# Patient Record
Sex: MALE | Race: WHITE | HISPANIC OR LATINO | URBAN - METROPOLITAN AREA
[De-identification: names, ages, dates, MRNs, and addresses within clinical notes are randomized per-mention and may not be internally consistent; named-entity substitution may affect disease eponyms.]

---

## 2018-04-27 ENCOUNTER — EMERGENCY (EMERGENCY)
Facility: HOSPITAL | Age: 23
LOS: 1 days | Discharge: HOME | End: 2018-04-27
Admitting: EMERGENCY MEDICINE
Payer: MEDICAID

## 2018-04-27 VITALS
RESPIRATION RATE: 20 BRPM | TEMPERATURE: 97 F | HEART RATE: 73 BPM | SYSTOLIC BLOOD PRESSURE: 142 MMHG | OXYGEN SATURATION: 100 % | DIASTOLIC BLOOD PRESSURE: 98 MMHG

## 2018-04-27 DIAGNOSIS — J34.89 OTHER SPECIFIED DISORDERS OF NOSE AND NASAL SINUSES: ICD-10-CM

## 2018-04-27 DIAGNOSIS — S02.2XXA FRACTURE OF NASAL BONES, INITIAL ENCOUNTER FOR CLOSED FRACTURE: ICD-10-CM
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
## 2018-04-27 PROCEDURE — 99284 EMERGENCY DEPT VISIT MOD MDM: CPT | Mod: 25

## 2018-04-27 RX ORDER — ACETAMINOPHEN 500 MG
650 TABLET ORAL ONCE
Refills: 0 | Status: COMPLETED | OUTPATIENT
Start: 2018-04-27 | End: 2018-04-27

## 2018-04-27 RX ADMIN — Medication 650 MILLIGRAM(S): at 23:37

## 2018-04-27 NOTE — ED ADULT NURSE NOTE - FALL HARM RISK TYPE OF ASSESSMENT
Thank you for choosing the [facility], Erin Randolph MD, and our team as your Neurology Specialists.  We actively use feedback to constantly improve and deliver the best care possible. To provide the best experience, we are collecting feedback from you on how we performed.  You may receive a survey in the mail to evaluate how we did. Please take a moment and share your thoughts.      If for any reason you feel that we did not meet your expectations or you want to share a positive experience, please give us a call. Your feedback helps us know how we are doing and what we can be doing better.    Office hours: 8:00 am to 4:30 pm, Monday - Friday  Phone: [phone]     Daily Assessment
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
## 2018-04-28 PROCEDURE — 70486 CT MAXILLOFACIAL W/O DYE: CPT | Mod: 26

## 2018-04-28 PROCEDURE — 70450 CT HEAD/BRAIN W/O DYE: CPT | Mod: 26

## 2018-04-28 NOTE — ED PROVIDER NOTE - NORMAL STATEMENT, MLM
Head AT, NC  Neck no cervical tenderness or  bony stepoffs.   Airway patent, +moderate nasal swelling with ttp, no lac or abrasion, no nasal bleeding, +old blood in bilateral nares, no septal hematomas bilaterally, mouth with normal mucosa. Throat has no vesicles, no oropharyngeal exudates and uvula is midline. Clear tympanic membranes bilaterally.
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
## 2018-05-01 ENCOUNTER — OUTPATIENT (OUTPATIENT)
Dept: OUTPATIENT SERVICES | Facility: HOSPITAL | Age: 23
LOS: 1 days | End: 2018-05-01
Payer: MEDICAID

## 2018-05-07 DIAGNOSIS — R69 ILLNESS, UNSPECIFIED: ICD-10-CM

## 2018-08-02 ENCOUNTER — EMERGENCY (EMERGENCY)
Facility: HOSPITAL | Age: 23
LOS: 1 days | Discharge: HOME | End: 2018-08-02
Attending: EMERGENCY MEDICINE
Payer: MEDICAID

## 2018-08-02 VITALS
WEIGHT: 147.05 LBS | DIASTOLIC BLOOD PRESSURE: 78 MMHG | TEMPERATURE: 99 F | SYSTOLIC BLOOD PRESSURE: 123 MMHG | HEART RATE: 92 BPM | RESPIRATION RATE: 20 BRPM

## 2018-08-02 VITALS
TEMPERATURE: 98 F | SYSTOLIC BLOOD PRESSURE: 114 MMHG | HEART RATE: 90 BPM | OXYGEN SATURATION: 100 % | RESPIRATION RATE: 20 BRPM | DIASTOLIC BLOOD PRESSURE: 75 MMHG

## 2018-08-02 LAB
ALBUMIN SERPL ELPH-MCNC: 4.1 G/DL — SIGNIFICANT CHANGE UP (ref 3.5–5)
ALP SERPL-CCNC: 58 U/L — SIGNIFICANT CHANGE UP (ref 40–120)
ALT FLD-CCNC: 23 U/L DA — SIGNIFICANT CHANGE UP (ref 10–60)
ANION GAP SERPL CALC-SCNC: 9 MMOL/L — SIGNIFICANT CHANGE UP (ref 5–17)
APPEARANCE UR: CLEAR — SIGNIFICANT CHANGE UP
AST SERPL-CCNC: 24 U/L — SIGNIFICANT CHANGE UP (ref 10–40)
BASOPHILS # BLD AUTO: 0.1 K/UL — SIGNIFICANT CHANGE UP (ref 0–0.2)
BASOPHILS NFR BLD AUTO: 0.4 % — SIGNIFICANT CHANGE UP (ref 0–2)
BILIRUB SERPL-MCNC: 0.3 MG/DL — SIGNIFICANT CHANGE UP (ref 0.2–1.2)
BILIRUB UR-MCNC: NEGATIVE — SIGNIFICANT CHANGE UP
BUN SERPL-MCNC: 16 MG/DL — SIGNIFICANT CHANGE UP (ref 7–18)
CALCIUM SERPL-MCNC: 8.8 MG/DL — SIGNIFICANT CHANGE UP (ref 8.4–10.5)
CHLORIDE SERPL-SCNC: 107 MMOL/L — SIGNIFICANT CHANGE UP (ref 96–108)
CO2 SERPL-SCNC: 26 MMOL/L — SIGNIFICANT CHANGE UP (ref 22–31)
COLOR SPEC: YELLOW — SIGNIFICANT CHANGE UP
CREAT SERPL-MCNC: 0.65 MG/DL — SIGNIFICANT CHANGE UP (ref 0.5–1.3)
DIFF PNL FLD: NEGATIVE — SIGNIFICANT CHANGE UP
EOSINOPHIL # BLD AUTO: 0 K/UL — SIGNIFICANT CHANGE UP (ref 0–0.5)
EOSINOPHIL NFR BLD AUTO: 0 % — SIGNIFICANT CHANGE UP (ref 0–6)
GLUCOSE SERPL-MCNC: 116 MG/DL — HIGH (ref 70–99)
GLUCOSE UR QL: NEGATIVE — SIGNIFICANT CHANGE UP
HCT VFR BLD CALC: 38 % — LOW (ref 39–50)
HGB BLD-MCNC: 12.6 G/DL — LOW (ref 13–17)
KETONES UR-MCNC: ABNORMAL
LEUKOCYTE ESTERASE UR-ACNC: ABNORMAL
LIDOCAIN IGE QN: 38 U/L — LOW (ref 73–393)
LYMPHOCYTES # BLD AUTO: 0.8 K/UL — LOW (ref 1–3.3)
LYMPHOCYTES # BLD AUTO: 5.5 % — LOW (ref 13–44)
MCHC RBC-ENTMCNC: 32.6 PG — SIGNIFICANT CHANGE UP (ref 27–34)
MCHC RBC-ENTMCNC: 33.1 GM/DL — SIGNIFICANT CHANGE UP (ref 32–36)
MCV RBC AUTO: 98.4 FL — SIGNIFICANT CHANGE UP (ref 80–100)
MONOCYTES # BLD AUTO: 0.2 K/UL — SIGNIFICANT CHANGE UP (ref 0–0.9)
MONOCYTES NFR BLD AUTO: 1.2 % — LOW (ref 2–14)
NEUTROPHILS # BLD AUTO: 13.7 K/UL — HIGH (ref 1.8–7.4)
NEUTROPHILS NFR BLD AUTO: 92.8 % — HIGH (ref 43–77)
NITRITE UR-MCNC: NEGATIVE — SIGNIFICANT CHANGE UP
PH UR: 8 — SIGNIFICANT CHANGE UP (ref 5–8)
PLATELET # BLD AUTO: 45 K/UL — LOW (ref 150–400)
POTASSIUM SERPL-MCNC: 3.8 MMOL/L — SIGNIFICANT CHANGE UP (ref 3.5–5.3)
POTASSIUM SERPL-SCNC: 3.8 MMOL/L — SIGNIFICANT CHANGE UP (ref 3.5–5.3)
PROT SERPL-MCNC: 7.5 G/DL — SIGNIFICANT CHANGE UP (ref 6–8.3)
PROT UR-MCNC: NEGATIVE — SIGNIFICANT CHANGE UP
RBC # BLD: 3.86 M/UL — LOW (ref 4.2–5.8)
RBC # FLD: 12.7 % — SIGNIFICANT CHANGE UP (ref 10.3–14.5)
SODIUM SERPL-SCNC: 142 MMOL/L — SIGNIFICANT CHANGE UP (ref 135–145)
SP GR SPEC: 1.01 — SIGNIFICANT CHANGE UP (ref 1.01–1.02)
UROBILINOGEN FLD QL: NEGATIVE — SIGNIFICANT CHANGE UP
WBC # BLD: 14.8 K/UL — HIGH (ref 3.8–10.5)
WBC # FLD AUTO: 14.8 K/UL — HIGH (ref 3.8–10.5)

## 2018-08-02 PROCEDURE — 81001 URINALYSIS AUTO W/SCOPE: CPT

## 2018-08-02 PROCEDURE — 74177 CT ABD & PELVIS W/CONTRAST: CPT

## 2018-08-02 PROCEDURE — 96375 TX/PRO/DX INJ NEW DRUG ADDON: CPT | Mod: 76

## 2018-08-02 PROCEDURE — 96374 THER/PROPH/DIAG INJ IV PUSH: CPT | Mod: XU

## 2018-08-02 PROCEDURE — 99284 EMERGENCY DEPT VISIT MOD MDM: CPT | Mod: 25

## 2018-08-02 PROCEDURE — 83690 ASSAY OF LIPASE: CPT

## 2018-08-02 PROCEDURE — 74177 CT ABD & PELVIS W/CONTRAST: CPT | Mod: 26

## 2018-08-02 PROCEDURE — 85027 COMPLETE CBC AUTOMATED: CPT

## 2018-08-02 PROCEDURE — 80053 COMPREHEN METABOLIC PANEL: CPT

## 2018-08-02 RX ORDER — ONDANSETRON 8 MG/1
4 TABLET, FILM COATED ORAL ONCE
Qty: 0 | Refills: 0 | Status: COMPLETED | OUTPATIENT
Start: 2018-08-02 | End: 2018-08-02

## 2018-08-02 RX ORDER — CEFTRIAXONE 500 MG/1
1 INJECTION, POWDER, FOR SOLUTION INTRAMUSCULAR; INTRAVENOUS ONCE
Qty: 0 | Refills: 0 | Status: COMPLETED | OUTPATIENT
Start: 2018-08-02 | End: 2018-08-02

## 2018-08-02 RX ORDER — SODIUM CHLORIDE 9 MG/ML
1000 INJECTION INTRAMUSCULAR; INTRAVENOUS; SUBCUTANEOUS ONCE
Qty: 0 | Refills: 0 | Status: COMPLETED | OUTPATIENT
Start: 2018-08-02 | End: 2018-08-02

## 2018-08-02 RX ORDER — METRONIDAZOLE 500 MG
1 TABLET ORAL
Qty: 30 | Refills: 0 | OUTPATIENT
Start: 2018-08-02 | End: 2018-08-11

## 2018-08-02 RX ORDER — MOXIFLOXACIN HYDROCHLORIDE TABLETS, 400 MG 400 MG/1
1 TABLET, FILM COATED ORAL
Qty: 20 | Refills: 0 | OUTPATIENT
Start: 2018-08-02 | End: 2018-08-11

## 2018-08-02 RX ORDER — METRONIDAZOLE 500 MG
500 TABLET ORAL ONCE
Qty: 0 | Refills: 0 | Status: COMPLETED | OUTPATIENT
Start: 2018-08-02 | End: 2018-08-02

## 2018-08-02 RX ADMIN — Medication 100 MILLIGRAM(S): at 11:12

## 2018-08-02 RX ADMIN — CEFTRIAXONE 100 GRAM(S): 500 INJECTION, POWDER, FOR SOLUTION INTRAMUSCULAR; INTRAVENOUS at 10:24

## 2018-08-02 RX ADMIN — SODIUM CHLORIDE 1000 MILLILITER(S): 9 INJECTION INTRAMUSCULAR; INTRAVENOUS; SUBCUTANEOUS at 06:22

## 2018-08-02 RX ADMIN — ONDANSETRON 4 MILLIGRAM(S): 8 TABLET, FILM COATED ORAL at 06:37
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
## 2018-08-02 NOTE — ED ADULT NURSE NOTE - NSIMPLEMENTINTERV_GEN_ALL_ED
Implemented All Universal Safety Interventions:  [place] to call system. Call bell, personal items and telephone within reach. Instruct patient to call for assistance. Room bathroom lighting operational. Non-slip footwear when patient is off stretcher. Physically safe environment: no spills, clutter or unnecessary equipment. Stretcher in lowest position, wheels locked, appropriate side rails in place.

## 2018-08-02 NOTE — ED PROVIDER NOTE - MEDICAL DECISION MAKING DETAILS
23 year old F Pt w/ abdominal pain, vomiting, diarrhea, will check CT abdomen/pelvis, labs, UA. Pt declines pain. medication. 23 year old F Pt w/ abdominal pain, vomiting, diarrhea, will check CT abdomen/pelvis, labs, UA. Pt declines pain. medication.    see progress note.

## 2018-08-02 NOTE — ED PROVIDER NOTE - CONDUCTED A DETAILED DISCUSSION WITH PATIENT AND/OR GUARDIAN REGARDING, MDM
lab results radiology results/return to ED if symptoms worsen, persist or questions arise/lab results/need for outpatient follow-up

## 2018-08-02 NOTE — ED PROVIDER NOTE - NS_ ATTENDINGSCRIBEDETAILS _ED_A_ED_FT
The scribe's documentation has been prepared under my direction and personally reviewed by me in its entirety. I confirm that the note above accurately reflects all work, treatment, procedures, and medical decision making performed by me. The scribe's documentation has been prepared under my direction and personally reviewed by me in its entirety. I confirm that the note above accurately reflects all work, treatment, procedures, and medical decision making performed by me..

## 2018-08-02 NOTE — ED ADULT TRIAGE NOTE - ARRIVAL INFO ADDITIONAL COMMENTS
pt biba from home reporting  vomiting x 10 hours, denies fever, diarrhea, recent travel or sick contacts.

## 2018-08-02 NOTE — ED PROVIDER NOTE - PROGRESS NOTE DETAILS
signed out to me by night team pending ctap. pt feels better, ct shows colitis, gave abx. vs wnl. abd nontender on reexamination. wants to go home. will try po abx. advised to return to ED if unable to tolerate po abx, pain persists or any concerning sx.

## 2018-08-02 NOTE — ED PROVIDER NOTE - OBJECTIVE STATEMENT
23 year old transgender F (male to female) Pt w/ no PMHx presents c/o  vomiting, 2 episodes of diarrhea, diffuse abdominal pain x 10 pm last night. Pt denies fever, chills, dysuria, and all other complaints. Pt reports symptoms began shortly after eating sushi.

## 2018-09-14 ENCOUNTER — EMERGENCY (EMERGENCY)
Facility: HOSPITAL | Age: 23
LOS: 1 days | Discharge: HOME | End: 2018-09-14
Attending: EMERGENCY MEDICINE
Payer: MEDICAID

## 2018-09-14 VITALS
OXYGEN SATURATION: 99 % | TEMPERATURE: 98 F | DIASTOLIC BLOOD PRESSURE: 45 MMHG | SYSTOLIC BLOOD PRESSURE: 97 MMHG | RESPIRATION RATE: 20 BRPM | HEART RATE: 66 BPM

## 2018-09-14 VITALS
TEMPERATURE: 98 F | HEART RATE: 75 BPM | WEIGHT: 145.06 LBS | HEIGHT: 69 IN | OXYGEN SATURATION: 98 % | DIASTOLIC BLOOD PRESSURE: 67 MMHG | RESPIRATION RATE: 20 BRPM | SYSTOLIC BLOOD PRESSURE: 98 MMHG

## 2018-09-14 LAB
ALBUMIN SERPL ELPH-MCNC: 3.9 G/DL — SIGNIFICANT CHANGE UP (ref 3.5–5)
ALP SERPL-CCNC: 70 U/L — SIGNIFICANT CHANGE UP (ref 40–120)
ALT FLD-CCNC: 21 U/L DA — SIGNIFICANT CHANGE UP (ref 10–60)
ANION GAP SERPL CALC-SCNC: 6 MMOL/L — SIGNIFICANT CHANGE UP (ref 5–17)
AST SERPL-CCNC: 15 U/L — SIGNIFICANT CHANGE UP (ref 10–40)
BASOPHILS # BLD AUTO: 0.1 K/UL — SIGNIFICANT CHANGE UP (ref 0–0.2)
BASOPHILS NFR BLD AUTO: 0.8 % — SIGNIFICANT CHANGE UP (ref 0–2)
BILIRUB SERPL-MCNC: 0.7 MG/DL — SIGNIFICANT CHANGE UP (ref 0.2–1.2)
BUN SERPL-MCNC: 12 MG/DL — SIGNIFICANT CHANGE UP (ref 7–18)
CALCIUM SERPL-MCNC: 8.6 MG/DL — SIGNIFICANT CHANGE UP (ref 8.4–10.5)
CHLORIDE SERPL-SCNC: 105 MMOL/L — SIGNIFICANT CHANGE UP (ref 96–108)
CO2 SERPL-SCNC: 28 MMOL/L — SIGNIFICANT CHANGE UP (ref 22–31)
CREAT SERPL-MCNC: 0.78 MG/DL — SIGNIFICANT CHANGE UP (ref 0.5–1.3)
EOSINOPHIL # BLD AUTO: 0.5 K/UL — SIGNIFICANT CHANGE UP (ref 0–0.5)
EOSINOPHIL NFR BLD AUTO: 3.7 % — SIGNIFICANT CHANGE UP (ref 0–6)
GLUCOSE SERPL-MCNC: 114 MG/DL — HIGH (ref 70–99)
HCT VFR BLD CALC: 39.1 % — SIGNIFICANT CHANGE UP (ref 39–50)
HGB BLD-MCNC: 12.7 G/DL — LOW (ref 13–17)
LYMPHOCYTES # BLD AUTO: 1.4 K/UL — SIGNIFICANT CHANGE UP (ref 1–3.3)
LYMPHOCYTES # BLD AUTO: 11.1 % — LOW (ref 13–44)
MCHC RBC-ENTMCNC: 31.6 PG — SIGNIFICANT CHANGE UP (ref 27–34)
MCHC RBC-ENTMCNC: 32.5 GM/DL — SIGNIFICANT CHANGE UP (ref 32–36)
MCV RBC AUTO: 97.5 FL — SIGNIFICANT CHANGE UP (ref 80–100)
MONOCYTES # BLD AUTO: 0.4 K/UL — SIGNIFICANT CHANGE UP (ref 0–0.9)
MONOCYTES NFR BLD AUTO: 3.5 % — SIGNIFICANT CHANGE UP (ref 2–14)
NEUTROPHILS # BLD AUTO: 10.3 K/UL — HIGH (ref 1.8–7.4)
NEUTROPHILS NFR BLD AUTO: 80.9 % — HIGH (ref 43–77)
PLATELET # BLD AUTO: 164 K/UL — SIGNIFICANT CHANGE UP (ref 150–400)
POTASSIUM SERPL-MCNC: 4 MMOL/L — SIGNIFICANT CHANGE UP (ref 3.5–5.3)
POTASSIUM SERPL-SCNC: 4 MMOL/L — SIGNIFICANT CHANGE UP (ref 3.5–5.3)
PROT SERPL-MCNC: 7.1 G/DL — SIGNIFICANT CHANGE UP (ref 6–8.3)
RBC # BLD: 4.01 M/UL — LOW (ref 4.2–5.8)
RBC # FLD: 12.4 % — SIGNIFICANT CHANGE UP (ref 10.3–14.5)
SODIUM SERPL-SCNC: 139 MMOL/L — SIGNIFICANT CHANGE UP (ref 135–145)
WBC # BLD: 12.7 K/UL — HIGH (ref 3.8–10.5)
WBC # FLD AUTO: 12.7 K/UL — HIGH (ref 3.8–10.5)

## 2018-09-14 PROCEDURE — 99283 EMERGENCY DEPT VISIT LOW MDM: CPT

## 2018-09-14 PROCEDURE — 85027 COMPLETE CBC AUTOMATED: CPT

## 2018-09-14 PROCEDURE — 96374 THER/PROPH/DIAG INJ IV PUSH: CPT

## 2018-09-14 PROCEDURE — 99284 EMERGENCY DEPT VISIT MOD MDM: CPT | Mod: 25

## 2018-09-14 PROCEDURE — 80053 COMPREHEN METABOLIC PANEL: CPT

## 2018-09-14 RX ORDER — DIPHENHYDRAMINE HCL 50 MG
50 CAPSULE ORAL ONCE
Qty: 0 | Refills: 0 | Status: COMPLETED | OUTPATIENT
Start: 2018-09-14 | End: 2018-09-14

## 2018-09-14 RX ADMIN — Medication 50 MILLIGRAM(S): at 10:30

## 2018-09-14 NOTE — ED PROVIDER NOTE - MEDICAL DECISION MAKING DETAILS
Patient with rash, doesn't appear to be angioedema or anaphylaxis, patient request blood work, likely discharge patient with Benadryl/ H1 blocker.

## 2018-09-14 NOTE — ED ADULT NURSE NOTE - NSIMPLEMENTINTERV_GEN_ALL_ED
Implemented All Universal Safety Interventions:  [place] to call system. Call bell, personal items and telephone within reach. Instruct patient to call for assistance. Room bathroom lighting operational. Non-slip footwear when patient is off stretcher. Physically safe environment: no spills, clutter or unnecessary equipment. Stretcher in lowest position, wheels locked, appropriate side rails in place.

## 2018-09-14 NOTE — ED PROVIDER NOTE - OBJECTIVE STATEMENT
22 y/o M patient currently transitioning from M-F w/ no significant PMHx and no significant PSHx presents to the ED with a facial rash and itchiness on the upper chest. Patient admits to also experiencing mild nausea. Patient denies abdominal pain and any other complaints. Patient is currently prescribed estrogen hormones, Spironolactone which patient has been using many years for transitioning. NKDA.
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
## 2018-09-14 NOTE — ED PROVIDER NOTE - PROGRESS NOTE DETAILS
reviewed results w pt, feeling better, advised return precautions. reviewed results w pt, feeling better, advised return precautions. allergy appt advised.

## 2022-02-08 NOTE — ED PROVIDER NOTE - SKIN, MLM
Patient presents to ED with c/o left lower abd pain that started today around 1pm
Skin normal color for race, warm, dry and intact. No evidence of rash.

## 2022-06-02 ENCOUNTER — APPOINTMENT (OUTPATIENT)
Dept: PEDIATRIC ALLERGY IMMUNOLOGY | Facility: CLINIC | Age: 27
End: 2022-06-02

## 2022-06-08 ENCOUNTER — APPOINTMENT (OUTPATIENT)
Dept: PEDIATRIC ALLERGY IMMUNOLOGY | Facility: CLINIC | Age: 27
End: 2022-06-08

## 2022-06-08 ENCOUNTER — APPOINTMENT (OUTPATIENT)
Dept: GASTROENTEROLOGY | Facility: CLINIC | Age: 27
End: 2022-06-08
Payer: MEDICAID

## 2022-06-08 ENCOUNTER — LABORATORY RESULT (OUTPATIENT)
Age: 27
End: 2022-06-08

## 2022-06-08 ENCOUNTER — OUTPATIENT (OUTPATIENT)
Dept: OUTPATIENT SERVICES | Facility: HOSPITAL | Age: 27
LOS: 1 days | End: 2022-06-08
Payer: MEDICAID

## 2022-06-08 VITALS
DIASTOLIC BLOOD PRESSURE: 80 MMHG | HEIGHT: 67 IN | OXYGEN SATURATION: 98 % | WEIGHT: 148 LBS | BODY MASS INDEX: 23.23 KG/M2 | SYSTOLIC BLOOD PRESSURE: 120 MMHG | HEART RATE: 70 BPM

## 2022-06-08 DIAGNOSIS — R10.32 RIGHT LOWER QUADRANT PAIN: ICD-10-CM

## 2022-06-08 DIAGNOSIS — Z80.41 FAMILY HISTORY OF MALIGNANT NEOPLASM OF OVARY: ICD-10-CM

## 2022-06-08 DIAGNOSIS — Z80.8 FAMILY HISTORY OF MALIGNANT NEOPLASM OF OTHER ORGANS OR SYSTEMS: ICD-10-CM

## 2022-06-08 DIAGNOSIS — R10.13 EPIGASTRIC PAIN: ICD-10-CM

## 2022-06-08 DIAGNOSIS — Z80.0 FAMILY HISTORY OF MALIGNANT NEOPLASM OF DIGESTIVE ORGANS: ICD-10-CM

## 2022-06-08 DIAGNOSIS — Z00.00 ENCOUNTER FOR GENERAL ADULT MEDICAL EXAMINATION WITHOUT ABNORMAL FINDINGS: ICD-10-CM

## 2022-06-08 DIAGNOSIS — R10.31 RIGHT LOWER QUADRANT PAIN: ICD-10-CM

## 2022-06-08 DIAGNOSIS — E55.9 VITAMIN D DEFICIENCY, UNSPECIFIED: ICD-10-CM

## 2022-06-08 DIAGNOSIS — K62.5 HEMORRHAGE OF ANUS AND RECTUM: ICD-10-CM

## 2022-06-08 DIAGNOSIS — Z78.9 OTHER SPECIFIED HEALTH STATUS: ICD-10-CM

## 2022-06-08 PROCEDURE — 99215 OFFICE O/P EST HI 40 MIN: CPT | Mod: 1L

## 2022-06-08 PROCEDURE — G0463: CPT

## 2022-06-08 PROCEDURE — 99204 OFFICE O/P NEW MOD 45 MIN: CPT

## 2022-06-08 RX ORDER — ESTRADIOL VALERATE 40 MG/ML
40 INJECTION INTRAMUSCULAR
Qty: 1 | Refills: 0 | Status: ACTIVE | COMMUNITY
Start: 2022-06-08 | End: 1900-01-01

## 2022-06-08 NOTE — SOCIAL HISTORY
[Sexually Active] : The patient is sexually active [Sometimes] : sometimes [Vaginal] : vaginal [Oral-Receptive (pt. mouth)] : oral-receptive (pt. mouth) [Anal-Receptive (pt anus)] : anal-receptive (pt anus) [To Pt Genitalia] : pt genitalia [Male ___] : [unfilled] male

## 2022-06-09 ENCOUNTER — RX CHANGE (OUTPATIENT)
Age: 27
End: 2022-06-09

## 2022-06-09 ENCOUNTER — TRANSCRIPTION ENCOUNTER (OUTPATIENT)
Age: 27
End: 2022-06-09

## 2022-06-09 DIAGNOSIS — F64.0 TRANSSEXUALISM: ICD-10-CM

## 2022-06-09 PROBLEM — R10.31 ABDOMINAL PAIN, BILATERAL LOWER QUADRANT: Status: ACTIVE | Noted: 2022-06-09

## 2022-06-09 PROBLEM — R10.13 DYSPEPSIA: Status: ACTIVE | Noted: 2022-06-09

## 2022-06-09 PROBLEM — K62.5 RECTAL BLEEDING: Status: ACTIVE | Noted: 2022-06-09

## 2022-06-09 PROBLEM — Z78.9 MALE-TO-FEMALE TRANSGENDER PERSON: Status: ACTIVE | Noted: 2022-06-02

## 2022-06-09 RX ORDER — FAMOTIDINE 20 MG/1
20 TABLET ORAL
Qty: 120 | Refills: 3 | Status: ACTIVE | COMMUNITY
Start: 2022-06-09 | End: 1900-01-01

## 2022-06-09 RX ORDER — WHEAT DEXTRIN/CALCIUM/ASPARTAM 3 G-200/6G
POWDER (GRAM) ORAL DAILY
Qty: 2 | Refills: 3 | Status: ACTIVE | COMMUNITY
Start: 2022-06-09 | End: 1900-01-01

## 2022-06-09 RX ORDER — BIFIDOBACTERIUM LONGUM 10MM CELL
4 CAPSULE ORAL
Qty: 30 | Refills: 3 | Status: ACTIVE | COMMUNITY
Start: 2022-06-09 | End: 1900-01-01

## 2022-06-09 NOTE — REVIEW OF SYSTEMS
[Feeling Tired] : feeling tired [Eyesight Problems] : eyesight problems [Abdominal Pain] : abdominal pain [Vomiting] : vomiting [Constipation] : constipation [Heartburn] : heartburn [Nocturia] : nocturia [Anxiety] : anxiety [Negative] : Heme/Lymph [FreeTextEntry8] : polyuria

## 2022-06-09 NOTE — ASSESSMENT
[FreeTextEntry1] : Abdominal Pain: The patient complains of abdominal pain. The patient is to avoid nonsteroidal anti-inflammatory drugs and aspirin.  I recommend a trial of Gas X 1 tablet PO 4 times a day for 3 months for the symptoms.\par Dyspepsia: The patient complains of dyspeptic symptoms.  The patient was advised to abide by an anti-gas (low FOD-MAP) diet.  The patient was given a pamphlet for anti-gas (low FOD-MAP).  The patient and I reviewed the anti-gas (low FOD-MAP) diet at length. The patient is to start on a trial of Simethicone one tablet 4 times a day p.r.n. abdominal pain and gas.\par GERD: The patient was advised to avoid late-night meals and dietary indiscretions.  The patient was advised to avoid fried and fatty foods.  The patient was advised to abide by an anti-GERD diet. The patient was given a pamphlet for anti-GERD.  The patient and I reviewed the anti-GERD diet at length. I recommend a trial of famotidine 20 mg twice a day x 3 months for the symptoms.\par Nausea/Vomiting: The patient complains of nausea/vomiting. If the symptoms of nausea/vomiting persists, the patient may require a trial of Zofran 4 mg twice a day.\par Constipation: The patient complains of constipation. I recommend a high-fiber diet. I recommend a trial of a probiotic such as Align once a day. I recommend a trial of Benefiber once a day for fiber supplementation.   The patient agreed and will followup to reassess the symptoms.  \par Rectal Bleeding: The patient had episodes of rectal bleeding.  The etiology of the rectal bleeding is unclear.  The patient may require a trial of Anusol HC suppositories one per rectum QHS and Anusol HC 2.5% cream apply to affected area twice a day PRN hemorrhoidal bleeding or pain.   I recommend Tucks pads for the hemorrhoids.  I recommend avoid wearing tight underwear and use boxers. I recommend avoid touching the perineal area. I recommend a colonoscopy to assess the site of bleeding. The patient was told of the risks and benefits of the procedure.  The patient was told of the risks of perforation, emergency surgery, bleeding, infections and missed lesions.  The patient agreed but will wait to schedule for the procedure at this time. The patient is to undergo vaginoscopy to assess the vaginal bleeding and discuss the possible need for the colonoscopy and possible MRI of the anus, pelvis and rectum with and without IV contrast to assess for possible recto-vaginal fistula versus other causes of the rectal and vaginal bleeding with the surgeon.  The patient is to return for the procedure.\par Blood Work: I recommend obtaining the recent blood work performed on the patient.\par Imaging Study: I recommend an imaging study to assess the symptoms. I recommend an MRI of the anus, pelvis and rectum with and without IV contrast to assess for possible recto-vaginal fistula versus other causes of the rectal and vaginal bleeding.  The patient is to return for the test after the vaginoscopy to assess the vaginal bleeding.\par Family History of GI Malignancy: The patient admits to a family history of GI problems.  The patient’s father and paternal grandfather had a history of gastric cancer.  I recommend an H. pylori breath test to assess for infection that carries an increased risk of gastric cancer.  Depending on the results and patient's symptoms, the patient may require an upper endoscopy to assess the stomach.\par \par \par \par

## 2022-06-09 NOTE — HISTORY OF PRESENT ILLNESS
[None] : had no significant interval events [Diarrhea] : denies diarrhea [Yellow Skin Or Eyes (Jaundice)] : denies jaundice [Rectal Pain] : denies rectal pain [Wt Gain ___ Lbs] : recent [unfilled] ~Upound(s) weight gain [Heartburn] : heartburn [Nausea] : nausea [Vomiting] : vomiting [Constipation] : constipation [Abdominal Pain] : abdominal pain [Abdominal Swelling] : abdominal swelling [GERD] : gastroesophageal reflux disease [Wt Loss ___ Lbs] : no recent weight loss [Hiatus Hernia] : no hiatus hernia [Peptic Ulcer Disease] : no peptic ulcer disease [Pancreatitis] : no pancreatitis [Cholelithiasis] : no cholelithiasis [Kidney Stone] : no kidney stone [Inflammatory Bowel Disease] : no inflammatory bowel disease [Irritable Bowel Syndrome] : no irritable bowel syndrome [Diverticulitis] : no diverticulitis [Alcohol Abuse] : no alcohol abuse [Malignancy] : no malignancy [Abdominal Surgery] : no abdominal surgery [Appendectomy] : no appendectomy [Cholecystectomy] : no cholecystectomy [de-identified] : The patient is a 26-year-old  transgender (male to female) with no significant past medical history who was referred to my office by Dr. Shaun Cadena for rectal and vaginal bleeding.  The patient also admits to having abdominal pain, dyspepsia, gastroesophageal reflux disease, nausea/vomiting, constipation, change in bowel habits, change in caliber of stool. I was asked to render an opinion for consultation for the above complaints.   The patient states that she/he is feeling uncomfortable.  The patient attributes the symptoms, starting after s/p vaginoplasty with penile inversion at [facility] on November 15, 2021.   The patient requires dilatations 2 to 3 x a day.  The patient is also s/p s/p breast augmentation and receiving estradiol injections every 2 weeks. The patient is now being followed by Dr. Clifford Lucas from [facility]. The patient was previously followed at [facility]. The patient complains of abdominal pain.  The patient describes the abdominal pain as a crampy, constant lower abdominal discomfort that is nonradiating in nature.  The abdominal pain is worse with meals and improves with passing gas or having a bowel movement.  The abdominal pain is described as moderate in nature.  The abdominal pain occurs in the morning.  The abdominal pain can occur at any time.   The abdominal pain has awakened the patient from sleep.  The abdominal pain is not relieved with any medications.  The abdominal pain is associated with abdominal gas and bloating.  The patient complains of nausea and bilious vomiting.  The patient complains of gastroesophageal reflux disease but denies any dysphagia. The gastroesophageal reflux disease is worse after meals and late at night and in the early morning.  The patient denies taking medications for the gastroesophageal reflux disease. The patient complains of palpitations but denies any atypical chest pain or shortness of breath. The patient admits to occasional episodes of diaphoresis.  The patient complains of constipation but denies any diarrhea.  The patient has 1 bowel movement every other day. The patient complains of a change in bowel habits.  The patient complains of a change in caliber of stool.   The patient admits to having mucus discharge with the bowel movements.  The patient complains of rectal bleeding but denies any melena or hematemesis.  The rectal bleeding is associated with constipation and possible internal hemorrhoids.  The patient complains of occasional rectal pruritus but denies any rectal pain. The patient also complains of bleeding from the vaginal area that is worse with constipation.  The patient admits to requiring vaginal dilatation 3 x a day. The patient denies any weight loss or anorexia.  The patient admits to gaining weight recently. He/She denies any fevers or chills.  The patient denies any jaundice or pruritus.  The patient denies any back pain.  The patient denies ever having a prior upper endoscopy and colonoscopy performed by another gastroenterologist.  The patient admits to a family history of GI problems.  The patient’s father and paternal grandfather had a history of gastric cancer. [de-identified] : (-) smoking, (-) ETOH, (-) IVDA\par

## 2022-06-10 LAB
ALBUMIN SERPL ELPH-MCNC: 5 G/DL
ALP BLD-CCNC: 73 U/L
ALT SERPL-CCNC: 7 U/L
ANION GAP SERPL CALC-SCNC: 16 MMOL/L
AST SERPL-CCNC: 14 U/L
BASOPHILS # BLD AUTO: 0.05 K/UL
BASOPHILS NFR BLD AUTO: 0.6 %
BILIRUB SERPL-MCNC: 0.5 MG/DL
BUN SERPL-MCNC: 13 MG/DL
C TRACH RRNA SPEC QL NAA+PROBE: NOT DETECTED
C TRACH RRNA SPEC QL NAA+PROBE: NOT DETECTED
CALCIUM SERPL-MCNC: 9.5 MG/DL
CHLORIDE SERPL-SCNC: 100 MMOL/L
CHOLEST SERPL-MCNC: 162 MG/DL
CO2 SERPL-SCNC: 23 MMOL/L
CREAT SERPL-MCNC: 0.63 MG/DL
EGFR: 135 ML/MIN/1.73M2
EOSINOPHIL # BLD AUTO: 0.36 K/UL
EOSINOPHIL NFR BLD AUTO: 4.2 %
ESTIMATED AVERAGE GLUCOSE: 108 MG/DL
FSH SERPL-MCNC: 5.2 IU/L
GLUCOSE SERPL-MCNC: 101 MG/DL
HBA1C MFR BLD HPLC: 5.4 %
HBV CORE IGG+IGM SER QL: NONREACTIVE
HBV SURFACE AB SERPL IA-ACNC: >1000 MIU/ML
HBV SURFACE AG SER QL: NONREACTIVE
HCT VFR BLD CALC: 37.3 %
HCV AB SER QL: NONREACTIVE
HCV S/CO RATIO: 0.1 S/CO
HDLC SERPL-MCNC: 88 MG/DL
HEPATITIS A IGG ANTIBODY: REACTIVE
HGB BLD-MCNC: 12 G/DL
HIV1+2 AB SPEC QL IA.RAPID: NONREACTIVE
IMM GRANULOCYTES NFR BLD AUTO: 0.2 %
LDLC SERPL CALC-MCNC: 51 MG/DL
LH SERPL-ACNC: 6.3 IU/L
LYMPHOCYTES # BLD AUTO: 1.76 K/UL
LYMPHOCYTES NFR BLD AUTO: 20.5 %
MAN DIFF?: NORMAL
MCHC RBC-ENTMCNC: 32.2 GM/DL
MCHC RBC-ENTMCNC: 32.7 PG
MCV RBC AUTO: 101.6 FL
MONOCYTES # BLD AUTO: 0.47 K/UL
MONOCYTES NFR BLD AUTO: 5.5 %
N GONORRHOEA RRNA SPEC QL NAA+PROBE: NOT DETECTED
N GONORRHOEA RRNA SPEC QL NAA+PROBE: NOT DETECTED
NEUTROPHILS # BLD AUTO: 5.92 K/UL
NEUTROPHILS NFR BLD AUTO: 69 %
NONHDLC SERPL-MCNC: 74 MG/DL
PLATELET # BLD AUTO: 246 K/UL
POTASSIUM SERPL-SCNC: 4.6 MMOL/L
PROLACTIN SERPL-MCNC: 12.9 NG/ML
PROT SERPL-MCNC: 7.1 G/DL
RBC # BLD: 3.67 M/UL
RBC # FLD: 14.3 %
SODIUM SERPL-SCNC: 139 MMOL/L
SOURCE ANAL: NORMAL
SOURCE ORAL: NORMAL
TRIGL SERPL-MCNC: 116 MG/DL
TSH SERPL-ACNC: 1.68 UIU/ML
WBC # FLD AUTO: 8.58 K/UL

## 2022-06-14 LAB
ESTRADIOL SERPL-MCNC: 312 PG/ML
T PALLIDUM AB SER QL IA: POSITIVE

## 2022-06-15 ENCOUNTER — NON-APPOINTMENT (OUTPATIENT)
Age: 27
End: 2022-06-15

## 2022-06-15 ENCOUNTER — APPOINTMENT (OUTPATIENT)
Dept: PEDIATRIC ALLERGY IMMUNOLOGY | Facility: CLINIC | Age: 27
End: 2022-06-15

## 2022-06-20 LAB
MONOMERIC PROLACTIN (ICMA)*: 10.5 NG/ML
PERCENT MACROPROLACTIN: 22 %
PROLACTIN, SERUM (ICMA)*: 13.4 NG/ML

## 2022-06-30 NOTE — ED ADULT NURSE NOTE - FALLEN IN THE PAST
6/30/2022         RE: Rufina Urrutia  [address]  [place] [address]        Dear Colleague,    Thank you for referring your patient, Rufina Urrutia, to the [facility] [place]. Please see a copy of my visit note below.        [facility]  Orthopaedic Surgery, Joint Replacement Consultation  by Zac Salazar M.D.    Rufina Urrutia MRN# [id number]    YOB: 1959     Requesting physician: No ref. provider found  Lawrence Ortega  Nephrology  Jonny Erwin, [facility]  Benjie Garcia            Assessment and Plan:   Assessment:  1. 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 R femoral head necrotic arc coronal 56 deg, sag 115 deg  2. L femoral head necrotic arc coronal 56 deg, sag 106 deg  2. Chronic renal disease on hemodialysis    Plan:  1. I explained to patient that there is no evidence of subchondral collapse or arthritis and I see no indication for performing hip replacement surgery.  However, she has greater than 30% involvement of the femoral head bilaterally and no evidence of collapse or subchondral fracture.  In this situation she is at high risk for eventual subchondral fracture in an effort to prevent this I have discussed with her the possibly performing core decompression augmented by bone marrow aspirate derived concentrated cellular grafting.  We discussed the risk benefits and alternatives to the procedure.  She understands the indication for the procedure is to prevent subchondral collapse and not necessarily to leave pain.  Furthermore the procedure is only helpful in approximately 50% of cases.  Patient has indicated a desire to proceed with above-mentioned surgery.  We will plan on doing so on a bilateral femoral head basis.  2. I will asked our nurse Brooks,  to arrange for the following proposed procedure: Bilateral femoral head core decompression with bone marrow  aspirate concentrate grafting, 1 hour, same-day surgery, tier 3.             History of Present Illness:   62 year old female  chief complaint    This patient returns for review of the MRI examination which was not available at her prior visit.  Furthermore we obtained CT scan to look for evidence of a subchondral fracture that might explain her pain.  I explained to the patient that the imaging studies do not show evidence of subchondral fracture and I am uncertain of the etiology of the pain that she is describing.  I remain concerned that the source of her pain is not from her hips.    Background history:  This patient is seen for third opinion in regards to her osteonecrosis of bilateral hips.  Patient states she has been seen at [facility] by Dr. Jonny Erwin and also Dr. Willie Ocasio at [facility] for evaluation of her hips.  She is having bilateral hip pain, greater on the right side.  She notes the pain mostly in the posterior lateral area of her buttock region.    She states that she was diagnosed with osteonecrosis.  Etiology is uncertain.  Patient states that she has a history of ethanol abuse over 30 years ago but has been sober for the last 30 years.  She acknowledges undergoing chemical dependency program for crack and narcotic usage but none for ethanol usage.    She states that she also has chronic renal disease and has been prescribed 1 week courses of prednisone on 2-3 occasions over   the last year.  No other steroid exposure systemically.  Patient has had numerous steroid injections around her hip.    Dr. Erwin did not prescribe any surgical intervention and Dr. Ocasio referred her for evaluation with me.    Of note is the patient's history of chronic renal disease.      Current symptoms:  Problem: Osteonecrosis of Bilateral Hips   Onset and duration: year and a half ago   Awakens from sleep due to sx's:  Yes  Precipitating Injury:  No    Other joints or sites painful:  Yes      Zac MONTOYA  MD Xiomara Salazar Family Professor  Oncology and Adult Reconstructive Surgery  Dept Orthopaedic Surgery, [facility]  [phone] office, [phone] pager  [email or web address]    Total combined visit time and work time before and after clinic visit = 20 min       no

## 2022-07-01 ENCOUNTER — APPOINTMENT (OUTPATIENT)
Dept: GASTROENTEROLOGY | Facility: CLINIC | Age: 27
End: 2022-07-01

## 2022-07-01 VITALS
HEIGHT: 67 IN | WEIGHT: 150 LBS | DIASTOLIC BLOOD PRESSURE: 76 MMHG | HEART RATE: 80 BPM | TEMPERATURE: 97.1 F | OXYGEN SATURATION: 96 % | BODY MASS INDEX: 23.54 KG/M2 | SYSTOLIC BLOOD PRESSURE: 109 MMHG

## 2022-07-01 DIAGNOSIS — R10.13 EPIGASTRIC PAIN: ICD-10-CM

## 2022-07-01 DIAGNOSIS — K59.00 CONSTIPATION, UNSPECIFIED: ICD-10-CM

## 2022-07-01 DIAGNOSIS — A04.8 OTHER SPECIFIED BACTERIAL INTESTINAL INFECTIONS: ICD-10-CM

## 2022-07-01 DIAGNOSIS — R11.2 NAUSEA WITH VOMITING, UNSPECIFIED: ICD-10-CM

## 2022-07-01 DIAGNOSIS — K21.9 GASTRO-ESOPHAGEAL REFLUX DISEASE W/OUT ESOPHAGITIS: ICD-10-CM

## 2022-07-01 DIAGNOSIS — Z87.19 PERSONAL HISTORY OF OTHER DISEASES OF THE DIGESTIVE SYSTEM: ICD-10-CM

## 2022-07-01 DIAGNOSIS — Z80.9 FAMILY HISTORY OF MALIGNANT NEOPLASM, UNSPECIFIED: ICD-10-CM

## 2022-07-01 LAB — UREA BREATH TEST QL: POSITIVE

## 2022-07-01 PROCEDURE — 99214 OFFICE O/P EST MOD 30 MIN: CPT

## 2022-07-01 RX ORDER — AMOXICILLIN 500 MG/1
500 TABLET, FILM COATED ORAL TWICE DAILY
Qty: 56 | Refills: 0 | Status: ACTIVE | COMMUNITY
Start: 2022-07-01 | End: 1900-01-01

## 2022-07-01 RX ORDER — CLARITHROMYCIN 500 MG/1
500 TABLET, FILM COATED ORAL TWICE DAILY
Qty: 28 | Refills: 0 | Status: ACTIVE | COMMUNITY
Start: 2022-07-01 | End: 1900-01-01

## 2022-07-01 RX ORDER — OMEPRAZOLE 40 MG/1
40 CAPSULE, DELAYED RELEASE ORAL TWICE DAILY
Qty: 28 | Refills: 0 | Status: ACTIVE | COMMUNITY
Start: 2022-07-01 | End: 1900-01-01

## 2022-07-01 NOTE — HISTORY OF PRESENT ILLNESS
[None] : had no significant interval events [Diarrhea] : denies diarrhea [Yellow Skin Or Eyes (Jaundice)] : denies jaundice [Rectal Pain] : denies rectal pain [Heartburn] : heartburn [Nausea] : nausea [Vomiting] : vomiting [Constipation] : constipation [Abdominal Pain] : abdominal pain [Abdominal Swelling] : abdominal swelling [GERD] : gastroesophageal reflux disease [Wt Gain ___ Lbs] : no recent weight gain [Hiatus Hernia] : no hiatus hernia [Peptic Ulcer Disease] : no peptic ulcer disease [Pancreatitis] : no pancreatitis [Cholelithiasis] : no cholelithiasis [Kidney Stone] : no kidney stone [Inflammatory Bowel Disease] : no inflammatory bowel disease [Irritable Bowel Syndrome] : no irritable bowel syndrome [Diverticulitis] : no diverticulitis [Alcohol Abuse] : no alcohol abuse [Malignancy] : no malignancy [Abdominal Surgery] : no abdominal surgery [Appendectomy] : no appendectomy [Cholecystectomy] : no cholecystectomy [de-identified] : The patient is a 26-year-old  transgender (male to female) with no significant past medical history. The patient states that she is feeling uncomfortable. The patient denies any jaundice or pruritus.  The patient denies any complains of chronic lower back pain. The patient complains of abdominal pain.  The patient describes the abdominal pain as a burning, intermittent midepigastric discomfort that is nonradiating in nature.  The abdominal pain is worse with stress.  The abdominal pain is worse with meals and improves with passing gas or having a bowel movement.  The abdominal pain is described as mild to moderate in nature.  The abdominal pain occurs in the morning.  The abdominal pain can occur at any time.   The abdominal pain has never awakened the patient from sleep.  The abdominal pain is slightly relieved with certain medication such as proton pump inhibitors, H2 blockers and antacids.  The abdominal pain is associated with abdominal gas and bloating. The patient complains of nausea and vomiting.  The patient complains of gastroesophageal reflux disease but denies any dysphagia.  The gastroesophageal reflux disease is worse after meals and in the early morning. The gastroesophageal reflux disease is improved with proton pump inhibitors, H2 blockers and antacids.   The patient denies any atypical chest pain, shortness of breath or palpitations.  The patient admits to occasional episodes of diaphoresis.  The patient complains of occasional constipation but denies any diarrhea.  The patient has 1 bowel movement a day. The patient complains of a change in bowel habits.  The patient complains of a change in caliber of stool. The patient denies having mucus discharge with the bowel movements.  The patient denies any bright red blood per rectum, melena or hematemesis.  The patient denies any rectal pain or rectal pruritus.  The patient complains of anorexia but denies any weight loss.  She denies any fevers or chills.  The H. pylori breath test performed on June 29, 2022 was positive. The blood work performed on June 8, 2022 revealed mild anemia with a hemoglobin/hematocrit level of 12.0/37.3, respectively, a normal hemoglobin A1c of 5.4% with an estimated average glucose of 108 mg/dL, a normal TSH of 1.68u IU/mL, a low vitamin D level of 18.8 ng/mL, a reactive hepatitis A IgG antibody, a normal total cholesterol/triglyceride level of 162/116 mg/dL, respectively, and elevated blood glucose of 101 mg/dL, normal liver enzymes with a total bilirubin of 0.5 mg/dL, a normal alkaline phosphatase/AST/ALT of 73/14/7 U/L, respectively, a low testosterone level of <2.5 ng/dL.  The patient is s/p vaginoplasty with penile inversion at [facility] on November 15, 2021. The patient requires dilatations 2 to 3 x a day. The patient is also s/p s/p breast augmentation and receiving estradiol injections every 2 weeks. The patient is now being followed by Dr. Clifford Lucas from [facility]. The patient was previously followed at [facility]. The patient admits to a family history of GI problems. The patient’s father and paternal grandfather had a history of gastric cancer.  [de-identified] : (-) smoking, (-) ETOH, (-) IVDA\par

## 2022-07-01 NOTE — ASSESSMENT
[FreeTextEntry1] : Abdominal Pain: The patient complains of abdominal pain. The patient is to avoid nonsteroidal anti-inflammatory drugs and aspirin. I recommend a trial of Omeprazole 40 mg once a day for 3 months for the symptoms.  \par Dyspepsia: The patient complains of dyspeptic symptoms.  The patient was advised to continue to abide by an anti-gas (low FOD-MAP) diet.  The patient was previously given a pamphlet for anti-gas (low FOD-MAP).  The patient and I reviewed the anti-gas (low FOD-MAP)diet at length again. The patient is to continue on a trial of Simethicone one tablet 4 times a day p.r.n. abdominal pain and gas.\par GERD: The patient was advised to avoid late-night meals and dietary indiscretions.  The patient was advised to avoid fried and fatty foods.  The patient was advised to abide by an anti-GERD diet. The patient was given a pamphlet for anti-GERD.  The patient and I reviewed the anti-GERD diet at length. I recommend a trial of Omeprazole 40 mg once a day x 3 months for the symptoms.\par Nausea: The patient complains of nausea. If the symptoms of nausea persists, the patient may require a trial of Zofran 4 mg twice a day. \par Constipation: The patient complains of constipation. I recommend a high-fiber diet. I recommend a trial of a probiotic such as Align once a day. I recommend a trial of Metamucil once a day for fiber supplementation.  If the symptoms persist, the patient may require a colonoscopy to assess the symptoms.  The patient was told of the risks and benefits of the procedure.  The patient was told of the risks of perforation, emergency surgery, bleeding, infections and missed lesions.  The patient agreed and will followup to reassess the symptoms.  \par H. Pylori Infection: The H. pylori breath test performed on June 29, 2022 was positive.  I recommend a trial of antibiotics for H. pylori eradication.  I recommend a trial of Clarithromycin 500 mg 2 times a day, Amoxicillin 500 mg 2 tablets twice a day and Omeprazole 40 mg twice a day for 14 days for H. pylori eradication. The patient is to then start a trial of Pantoprazole 40 mg once a day x 1 month after completing antibiotic treatment. The patient is to stop Pantoprazole x 2 weeks prior to returning to the office. The patient is to return to the office without eating or drinking anything x 2 hours prior to the scheduled appointment. The patient is to return to the office in 2 months for an H. pylori breath test to assess for eradication of the bacteria.\par History of Rectal Bleeding: The patient had episodes of rectal bleeding that has resolved. The etiology of the rectal bleeding was unclear. The patient may require a trial of Anusol HC suppositories one per rectum QHS and Anusol HC 2.5% cream apply to affected area twice a day PRN hemorrhoidal bleeding or pain. I recommend Tucks pads for the hemorrhoids. I recommend avoid wearing tight underwear and use boxers. I recommend avoid touching the perineal area. If the symptoms recur, the patient may require a colonoscopy to assess the site of bleeding. The patient was told of the risks and benefits of the procedure. The patient was told of the risks of perforation, emergency surgery, bleeding, infections and missed lesions. The patient agreed but will wait to schedule for the procedure at this time. The patient is s/p vaginoscopy to assess the vaginal bleeding and told of granulation tissue.  If the symptoms persist, the patient may require a colonoscopy and possible MRI of the anus, pelvis and rectum with and without IV contrast to assess for possible recto-vaginal fistula versus other causes of the rectal and vaginal bleeding with the surgeon. The patient is to return for re-evaluation.\par Blood Work: I reviewed the recent blood work performed on the patient.\par Imaging Study: If the symptoms persist, the patient may require an MRI of the anus, pelvis and rectum with and without IV contrast to assess for possible recto-vaginal fistula versus other causes of the rectal and vaginal bleeding. The patient is to return for the test s/p vaginoscopy for the vaginal bleeding.\par Family History of GI Malignancy: The patient admits to a family history of GI problems. The patient’s father and paternal grandfather had a history of gastric cancer. The H. pylori breath test was positive.   The H. pylori infection carries an increased risk of gastric cancer. Depending on the symptoms after treatment, the patient may require an upper endoscopy to assess the stomach.\par Follow-up: The patient is to follow-up in the office in 2 months to reassess the symptoms. The patient was told to call the office if any further problems. \par \par \par \par \par \par

## 2022-07-06 ENCOUNTER — APPOINTMENT (OUTPATIENT)
Dept: PEDIATRIC ALLERGY IMMUNOLOGY | Facility: CLINIC | Age: 27
End: 2022-07-06

## 2022-09-29 LAB — 25(OH)D3 SERPL-MCNC: 18.8 NG/ML

## 2022-09-29 RX ORDER — ADHESIVE TAPE 3"X 2.3 YD
50 MCG TAPE, NON-MEDICATED TOPICAL
Qty: 30 | Refills: 3 | Status: ACTIVE | COMMUNITY
Start: 2022-09-29 | End: 1900-01-01

## 2022-12-05 ENCOUNTER — APPOINTMENT (OUTPATIENT)
Dept: PLASTIC SURGERY | Facility: CLINIC | Age: 27
End: 2022-12-05

## 2022-12-05 VITALS — BODY MASS INDEX: 21.66 KG/M2 | WEIGHT: 138 LBS | HEIGHT: 67 IN

## 2022-12-05 PROCEDURE — 99203 OFFICE O/P NEW LOW 30 MIN: CPT

## 2022-12-06 NOTE — HISTORY OF PRESENT ILLNESS
[FreeTextEntry1] : Roxie HERNANDEZ is a 26 year old, transfemale seen on 06/08/2022 for intial transgender care program intake visit.\par \par Preferred Pronouns: she/her\par Sexual orientation: straight\par Gender identity: female\par Assigned male at birth\par Specific Terms for Body Parts: medical terms\par Call pt: Roxie\par \par • Marital Status: single\par \par • COVID19 history/vaccination: COVID-19 Symptom screening: no fever, nasal congestion, cough, sob, loss of smell/taste, or diarrhea.   \par \par • Transition HX + Family Support + Present Life: Oswaldo first identified as a women when she was 19 years old and came o0ut to her parents and friends.  She's used the name Chistinein since them with she/her pronouns. She statted hormoen affimring therapy in 2013.   She had an orchiectomy and vaginoplasty at [facility] in 2021 (Dr. Rodriguez), and is scheduling breast augmentation on Sept 8, 2022 at [facility].  She presents fully female in all parts of her life, and work.  \par \par • Education | Employment: High school graduation in [place], [place].  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 \par \par • Coping: Practices include. listinge to musci, meditation, cook ( food and italian) \par \par • Feelings of Gender Dysphoria/ Body Dysmorphia: face mostly and chest also; Pt feesl she does pass to most people. She was outed once in shopping recently and this was very concerning to her. \par \par • Gender Presentation: female, in medical itiire. \par \par • Hobbies/Activities: bike ridking and nature \par \par • Tattoos/ Piercing: : 3 all done in shops with clean needles. \par \par • Cigarette, Vaping, Marijuana, Alcohol, and Drug Use:  no tobacco, no vaping, social alcohol, no substance. \par \par • Reproductive Endocrinology:. did not save david, but she has regret that she did not. \par \par • Gender Affirming Surgery:. \par Organ invorry:\par - Breasts 2nd to estrogen\par - Vagina s/p vaginoplasty  (Penile inversion technique) \par - No testes\par - Present prostate\par \par • Binding/Tucking:  not needed\par \par • Name Change + Gender Marker: never felt the need previously, but is interseted now in both name change and gender marker change.  Work allows her to use her chosen name.  \par \par • Nutrition: 3 mesals a day, eating normal.  Some constipation s/p vaginoplasty. \par \par • Sexual Health:   Sex with men (cis-men).   One parter in 3 months.   \par   - Pt does have vaginal sex; and oral sex, and anal sex\par \par • Health Screening: \par Last HIV test:  in 2021\par Last STI tests and sites:  in 2021\par Pap/Self-exam/Mammography/Colonoscopy: not yet\par \par Pt reports she has consitpation and vaginal bleeding after defication.   Her Plastic surgeon wants her to have a GI eval and colonoscopy to rule out a colono-vaginal fistula.  \par \par Goals of Trans care:\par 1)  Continue hormone affirming therapy\par 2)  Mental health support\par 3)  Facial feminiaztion eval and surgery - chin and cheek bones and hairline\par 4) Gender marker change\par 5) Planning Sept 2022 Breast Augmentation already\par   Doxycycline Counseling:  Patient counseled regarding possible photosensitivity and increased risk for sunburn.  Patient instructed to avoid sunlight, if possible.  When exposed to sunlight, patients should wear protective clothing, sunglasses, and sunscreen.  The patient was instructed to call the office immediately if the following severe adverse effects occur:  hearing changes, easy bruising/bleeding, severe headache, or vision changes.  The patient verbalized understanding of the proper use and possible adverse effects of doxycycline.  All of the patient's questions and concerns were addressed.

## 2022-12-11 NOTE — DISCUSSION/SUMMARY
[FreeTextEntry1] : This deana patient began transitioning 9 years ago.  \par She has been on hormonal therapy consistently since 18 years old.\par She has been in therapy and was diagnosed with Gender Dysphoria concerned about certain facial features that appear masculine and cause bullying desires facial feminization surgery followed by Transgender team.\par The following facial features appear masculine and will need to be modified:\par -Brow \par -Nose \par -Jawline \par -Neck\par -Buccal fat\par \par + FH: noncontributory \par \par Allergies:\par - As per patient: "Anesthesia (not sure)"\par \par Meds:\par - Estrogen\par \par Surgical History\par \par Surgery Type: Breast surgery\par Location: [facility]\par Surgeon: Dr. Merlos\par Year: 2022\par Complications?: None\par \par Surgery Type: Vaginoplasty\par Location: [facility]\par Surgeon: Dr. Grant\par Year: 2021\par Complications?: Yes\par \par Surgery Type: Correction Vaginoplasty\par Location: [facility]\par Surgeon: Dr. Grant\par Year: 2022\par Complications?: No\par \par Patient denies history of botox, fillers, or silicone injections\par \par SH: Denies marijuana use, no secondary tobacco use\par \par ROS:\par General health / Constitutional: no fever, no chills, no unusual weight changes, no energy level changes, no night sweats \par Skin: No color or pigmentation changes, no pruritis, no rashes, no ulcers, \par Hair: No changes in color, texture, distribution, loss \par Nails: No color changes, brittleness, infection \par Head: No headaches, no new jaw pain \par Eyes: Good visual acuity, no color blindness, no corrective lenses, no photophobia, no diplopia, no blurred vision, no infection, pain, no medications, \par Ear: no tinnitus, no discharge, no pain, no medications \par Nose: no epistaxis, no rhinorrhea, no rhinitis, no pain, \par Mouth & Throat: no gingivitis, no gingival bleeding, no ulcers, no voice changes, no changes in oral mucosa or tongue Neck no stiffness, no pain, no lymphadenitis, no thyroid disorders, \par Respiratory: no cough, no dyspnea, no wheezing, no chest pain, cyanosis, no pneumonia, no asthma, \par Cardiovascular: no chest pain, no palpitations, no irregular rhythm, no tachycardia, no bradycardia, no heart failure, no dyspnea on exertion (AGGARWAL), no edema \par Gastrointestinal: no nausea / vomiting, no dysphagia, no reflux / GERD, no abdominal pain, no jaundice \par Musculoskeletal: no pain in muscles, bones, or joints; no fractures, no dislocations, no muscular weakness, no atrophy \par Neurological: no paresis, no paralysis, no paresthesia, no seizures, no dizziness, no syncope, no ataxia, no tremor \par Psychological: no childhood behavioral problems, no irritability, no sleep changes \par Hematological: no anemia, no bruising, no bleeding tendencies, \par \par PHYSICAL EXAM \par General: WDWN, in no distress, A & O x 3 (person, place, time) \par HEENT Head: AT/NC (atraumatic, normocephalic), including TMJ, sensory and motor; + Prominent brow and lateral orbital rim type III \par Eyes: EOMI, PERRLA \par Ears: exterior, nl hearing, \par Nose: + slightly wide, bulbous nasal tip with acute nasolabial angle intranasal exam showed enlarged turbinates and deviated caudal septum \par Throat & mouth: gd palate elevation, nl tongue mobility, nl tonsil size; + Prominent mandibular angle with active masseter, boxy wide chin, excess buccal fat.\par Neck: no masses, nl pulses, + prominent tracheal bulge ("Parag's Apple"), excess submental fat.\par \par We had a 25 minute meeting with the patient discussing diagnosis and medical management issues and outcomes. \par \par First stage FFS: \par 21139: Frontal sinus anterior wall setback\par 21256: Orbital reconstruction bilateral\par 15824: Browlift bilateral and hairline lowering\par 21172: Supraorbital bar recontouring bilateral\par 67875: Tarsorrhaphy bilateral\par 21209, Mandibular angle resection bilateral\par 21127: Mandibular reconstruction with autologous tissue bilateral\par 21122: Osseous genioplasty narrowing, shortening\par 21125: Mandibular reconstruction with prosthetics bilateral\par 27443: Rhinoplasty open\par 88877: Submucous resection of septum\par 05934: Cartilage grafting for nasal reconstruction, use of cadaveric cartilage for  grafts, columellar strut, tip graft\par 69822: Tracheal shave, Laryngeothyroidoplasty\par 15839 x 2: Submental fat and buccal fat excision\par 89486: platysmaplasty\par \par      04879 (Frontal sinus setback): Previous exposure to testosterone has led this patient to have a male appearing forehead with a more bossed shaped; this is opposed to a female appearing forehead that is more flat. A frontal sinus setback procedure will reshape the anterior wall of the frontal sinus by pushing back the bone and change the contour from ‘convex’ (male-shape) to ‘flat’ (female-shape). This surgical change will create a more flattened feminine brow appearance.\par \par ·       61717 (Browlift): Previous exposure to testosterone has led to the patient having a male ‘M-shaped’ hairline as opposed to a female ‘upside-down U-shaped’ hairline. Her receded hairline also creates a high, broad male appearing forehead. Her low set eyebrows on top of her orbital roof rim give her a rocha, male-appearing look. Both of these male traits: a high hairline and low-set brows are causing her intense feelings of dysphoria. She would benefit from bilateral browlift and hairline lowering procedures. Raising her lateral eyebrow with a bilateral browlift will create a more female appearance. She has stressed a strong desire to wear her own natural hair and does not want to always have to wear a wig to cover up her male features.\par \par ·       58768 (bilateral orbital reconstruction): The bone growth that occurred during testosterone exposure in the upper half of each orbital has caused this patient to have male appearing orbital features that contribute to the sense of dysphoria she feels in public. Orbital reconstruction with a reciprocating saw for an “L-shaped” ostectomy, on both sides will help remove the excessive bony protrusion; this will be followed by bone material grafting and resorbable plate fixation. The bilateral orbital reconstruction will help alleviate these orbital and upper facial male features.\par \par ·       07971 (Supraorbital bar contouring): This patient has orbital lateral hooding or overhang of her lateral frontal bone which is typically associated with the male skull and orbits. Supraorbital contouring of this lateral orbital region with a pineapple shira will correct this male feature that is causing this patient dysphoria. These procedures also allows us to do a success browlift procedure since the overhang of bone can inhibit the upper movement of the brow and the removal of this allows for an effective lift.\par \par ·       20254 (Tarsorrhaphy): Bilateral tarsorrhaphy or surgical closure of both eyelids, after protective lacrilube or perilube ointment is applied, is necessary for the safety of the patient’s globes. With the brow reshaping and browlift procedure the upper eyelid will be pulled up so the globe will be exposed and unprotected during this long, proposed procedure. The tarsorrhaphies, allows both globes to be protected so the complications of corneal abrasions may be prevented.\par \par ·       59513 (mandibular angle resection/reduction): This patient has an angular, more boxy jaw which results in male appearing lower face. She also has increased lower facial width related to her mandibular angle lateral projection. Mandibular angle resection/reduction will create a more feminine triangular jaw. By having a mandibular angle reduction, the lower facial width is narrowed and this will create a “V-shaped”, feminine appearance of the jawline when viewed from the front.\par \par ·       86821 (Reconstruction of lower jaw with autologous tissue): This patient’s wide, “U-shaped” lower jaw accounts for public criticism related to male facial features. We propose reconstruction of the lower jaw with bone graft material layered to form a more feminine shape. The goal of this autologous tissue reconstructive procedure is to achieve a tapered, feminine lower jaw.\par \par ·       61059 (osseous genioplasty): This patient has a wide, large chin that contributes to her feelings of gender dysphoria and being mis-gendered in public.  The patient would benefit from an osseous genioplasty that narrows and shortens the chin. The osseous genioplasty will help create a more feminine and more, slender chin. \par \par ·       83327 (Reconstruction of lower jaw with prosthetic): This patient complained of a male shape to the lower one-third of her face. Reconstruction with a titanium implant used to create a chin point angle and support the bone in healing will help the patient achieve her goal of a more female lower face.\par \par ·       98245 (Rhinoplasty): This patient’s nose has characteristics of a male nose. The male nose is often larger and wider with a more bulbous nasal tip. These male nasal features make her feel masculine which exacerbates her gender dysphoria. A rhinoplasty would be beneficial to feminize her nose by creating a smaller nose and more delicate, softer nasal tip. The lateral dorsal shape will also be feminized by the rhinoplasty.\par \par ·       61075 (Submucous resection of nose): This patient’s nasal septum is shaped like a male septum. The septum is the supportive pole that holds up the structure of the nasal pyramid. In this case the septum will also be used to provide cartilage tissue necessary for nasal grafts. This septoplasty is required to modify the septum to create a straight nose with good functional breathing while taking away the characteristics of a male nose.\par \par ·       29712 (Cartilage grafting for nasal reconstruction): Cartilage grafting is crucial for the performance of the feminizing rhinoplasty. This patient has an ethnic type of nose. With regards to her nose, she wants to keep true to her ethnicity while appearing more feminine. To do that cartilage grafts including, bilateral  grafts, a columellar strut graft, a dorsal onlay graft, and nasal tip graft are all necessary.  The dorsal onlay graft will raise the nasal radix and improve the frontonasal regional shape.\par \par ·       15839 x 2 (Submental fat removal and buccal fat removal): Testosterone exposure will build fibrofatty tissue in the submental and buccal fat region that is not corrected by hormone therapy. This patient has this fibrofatty tissue in the submental and buccal fat region which has created a masculine lateral profile appearance. Direct submental and buccal fat fibrofatty tissue excision is necessary to correct this male feature.\par \par ·       86242 (Platysmaplasty): With prolonged testosterone exposure, the submental region will appear full and ptotic. This patient has a full and ptotic submental and neck region which is associated with a male-appearing neck. The female neck is slender and tight. The platsymaplasty, performed after submental fat excision, will help create a slender and tight, female appearing neck.\par \par ·       43723 (Tracheal shave or tracheolaryngoplasty): A prominent “Parag’s Apple” is a feature associated with males. Not all trans-women have a prominent “Parag’s Apple”.. However, this trans-woman has a prominent ‘Parag’s Apple (also known as a large laryngeal prominence). This is seen on lateral head position and when her head is raised to the roopa. It causes her intense feelings of dysphoria and it is a cause for misgendering. Therefore, the patient would benefit from a tracheal shave procedure which eliminates this prominent “Parag’s Apple” associated with male appearance.\par \par \par Needs a 3D CT scan and Virtual Surgical Planning. She will need to provide a letter from her therapist and hormone provider. Will need PCP clearance.\par \par Patient seen in conjunction with Dr. Alexander Delvalle.

## 2022-12-31 ENCOUNTER — OUTPATIENT (OUTPATIENT)
Dept: OUTPATIENT SERVICES | Facility: HOSPITAL | Age: 27
LOS: 1 days | End: 2022-12-31
Payer: MEDICAID

## 2022-12-31 ENCOUNTER — APPOINTMENT (OUTPATIENT)
Dept: CT IMAGING | Facility: IMAGING CENTER | Age: 27
End: 2022-12-31
Payer: MEDICAID

## 2022-12-31 DIAGNOSIS — F64.0 TRANSSEXUALISM: ICD-10-CM

## 2022-12-31 PROCEDURE — 70486 CT MAXILLOFACIAL W/O DYE: CPT | Mod: 26

## 2022-12-31 PROCEDURE — 70486 CT MAXILLOFACIAL W/O DYE: CPT

## 2023-02-24 ENCOUNTER — APPOINTMENT (OUTPATIENT)
Dept: PLASTIC SURGERY | Facility: CLINIC | Age: 28
End: 2023-02-24
Payer: MEDICAID

## 2023-02-24 VITALS
SYSTOLIC BLOOD PRESSURE: 111 MMHG | DIASTOLIC BLOOD PRESSURE: 74 MMHG | BODY MASS INDEX: 21.82 KG/M2 | WEIGHT: 139 LBS | TEMPERATURE: 97.5 F | HEIGHT: 67 IN | OXYGEN SATURATION: 99 % | HEART RATE: 83 BPM

## 2023-02-24 PROCEDURE — 99214 OFFICE O/P EST MOD 30 MIN: CPT

## 2023-02-25 RX ORDER — OXYCODONE 5 MG/1
5 TABLET ORAL
Qty: 12 | Refills: 0 | Status: ACTIVE | COMMUNITY
Start: 2023-02-25 | End: 1900-01-01

## 2023-02-25 RX ORDER — GABAPENTIN 300 MG/1
300 CAPSULE ORAL
Qty: 10 | Refills: 0 | Status: ACTIVE | COMMUNITY
Start: 2023-02-25 | End: 1900-01-01

## 2023-02-25 RX ORDER — CHLORHEXIDINE GLUCONATE, 0.12% ORAL RINSE 1.2 MG/ML
0.12 SOLUTION DENTAL
Qty: 1 | Refills: 0 | Status: ACTIVE | COMMUNITY
Start: 2023-02-25 | End: 1900-01-01

## 2023-02-25 RX ORDER — IBUPROFEN 600 MG/1
600 TABLET, FILM COATED ORAL EVERY 6 HOURS
Qty: 28 | Refills: 0 | Status: ACTIVE | COMMUNITY
Start: 2023-02-25 | End: 1900-01-01

## 2023-02-26 NOTE — DISCUSSION/SUMMARY
[FreeTextEntry1] : Patient presented for preoperative consultation prior to facial feminization surgery\par We reviewed the virtual planning with the patient\par She desires to have the following facial features modified:\par -Brow\par -Nose\par -Jawline\par -Neck\par We reviewed these procedures and their risks\par  \par FH: noncontributory\par  \par SH: no secondary tobacco use,\par  \par ROS:\par General health / Constitutional\par      no fever, no chills, no unusual weight changes, no energy level changes, no night sweats\par Skin\par       No color or pigmentation changes, no pruritis, no rashes, no ulcers,  \par Hair\par       No changes in color, texture,  distribution, loss\par Nails\par       No color changes, brittleness, infection\par Head\par       No headaches, no new jaw pain\par Eyes\par       Good visual acuity, no color blindness, no corrective lenses, no photophobia, no diplopia, no blurred vision, no infection, pain, no medications, \par Ear\par      no tinnitus, no discharge, no pain, no medications\par Nose\par      no epistaxis, no rhinorrhea, no rhinitis, no pain, \par Mouth & Throat\par      no gingivitis, no gingival bleeding, no ulcers, no voice changes, no changes in oral mucosa or tongue\par Neck\par      no stiffness, no pain, no lymphadenitis, no thyroid disorders, \par Respiratory\par      no cough, no dyspnea, no wheezing,  no chest pain, cyanosis, no pneumonia, no asthma, \par Cardiovascular\par      no chest pain, no palpitations, no irregular rhythm, no tachycardia, no bradycardia, no heart failure, no dyspnea on exertion (AGGARWAL), no edema\par Gastrointestinal\par      no nausea / vomiting, no dysphagia, no reflux / GERD, no abdominal pain, no jaundice\par Musculoskeletal\par      no pain in muscles, bones, or joints; no fractures, no dislocations, no muscular weakness, no atrophy\par Neurological\par      no paresis, no paralysis, no paresthesia, no seizures, no dizziness, no syncope, no ataxia, no tremor\par Psychological\par      no childhood behavioral problems, no irritability, no sleep changes\par Hematological\par      no anemia, no bruising, no bleeding tendencies, \par  \par PHYSICAL EXAM\par General\par WDWN, in no distress,  A & O x 3 (person, place, time) \par HEENT\par Head: AT/NC (atraumatic, normocephalic), including TMJ, sensory and motor; \par Prominent brow and lateral orbital rim type III\par Eyes: EOMI, PERRLA\par Ears: exterior, nl hearing,\par Nose: slightly wide, bulbous nasal tip with acute nasiolabial angle\par intranasal exam showed enlarged turbinates and deviated caudal septum\par Throat & mouth: gd palate elevation, nl tongue mobility, nl tonsil size\par Prominent mandibular angle with active masseter\par Wide chin\par  \par Neck: no masses, nl pulses\par +excess submental fat\par  \par We had a 25 minute meeting with the patient discussing diagnosis and medical management issues and outcomes.\par First stage FFS:\par -Brow lift\par -frontal sinus setback\par -supraorbital brow recontouring\par -mandibular angle reduction b/l\par -chin narrowing\par -rhinoplasty, SMR, cartilage grafting\par -submental fat excision, platysmaplasty\par -Tracheal shave\par -Buccal fat excision\par \par Spend 25 minutes with patient discussing the diagnosis and treatment plan. \par Patient informed of the timing and risks moving forward.\par All patient questions were answered.\par Patient was given written instructions for care and follow up visit. We discussed the instructions and the patient confirmed an understanding of them.\par \par \par \par \par \par

## 2023-03-03 ENCOUNTER — TRANSCRIPTION ENCOUNTER (OUTPATIENT)
Age: 28
End: 2023-03-03

## 2023-03-03 VITALS
HEART RATE: 72 BPM | SYSTOLIC BLOOD PRESSURE: 107 MMHG | HEIGHT: 67 IN | WEIGHT: 145.06 LBS | TEMPERATURE: 98 F | DIASTOLIC BLOOD PRESSURE: 57 MMHG | OXYGEN SATURATION: 98 % | RESPIRATION RATE: 18 BRPM

## 2023-03-04 ENCOUNTER — INPATIENT (INPATIENT)
Facility: HOSPITAL | Age: 28
LOS: 1 days | Discharge: HOME | DRG: 876 | End: 2023-03-06
Attending: SURGERY | Admitting: SURGERY
Payer: MEDICAID

## 2023-03-04 ENCOUNTER — APPOINTMENT (OUTPATIENT)
Dept: PLASTIC SURGERY | Facility: HOSPITAL | Age: 28
End: 2023-03-04
Payer: MEDICAID

## 2023-03-04 DIAGNOSIS — Z98.890 OTHER SPECIFIED POSTPROCEDURAL STATES: Chronic | ICD-10-CM

## 2023-03-04 DIAGNOSIS — Z98.82 BREAST IMPLANT STATUS: Chronic | ICD-10-CM

## 2023-03-04 LAB
HCV AB S/CO SERPL IA: 0.05 S/CO — SIGNIFICANT CHANGE UP
HCV AB SERPL-IMP: SIGNIFICANT CHANGE UP
HIV 1+2 AB+HIV1 P24 AG SERPL QL IA: SIGNIFICANT CHANGE UP

## 2023-03-04 PROCEDURE — 31899 UNLISTED PX TRACHEA BRONCHI: CPT

## 2023-03-04 PROCEDURE — 21256 RECONSTRUCTION OF ORBIT: CPT

## 2023-03-04 PROCEDURE — 30520 REPAIR OF NASAL SEPTUM: CPT

## 2023-03-04 PROCEDURE — 15825 RHYTDCT NCK PLTYSML TGHTG: CPT

## 2023-03-04 PROCEDURE — 30410 RECONSTRUCTION OF NOSE: CPT

## 2023-03-04 PROCEDURE — 21139 RDCTJ FOREHEAD CNTRG&SETBACK: CPT

## 2023-03-04 PROCEDURE — 15839 EXC EXCESSIVE SKN OTHER AREA: CPT

## 2023-03-04 PROCEDURE — 20912 REMOVE CARTILAGE FOR GRAFT: CPT | Mod: 59

## 2023-03-04 PROCEDURE — 67875 CLOSURE OF EYELID BY SUTURE: CPT

## 2023-03-04 PROCEDURE — 21122 GENIOP SLDG OSTEOT 2/>: CPT

## 2023-03-04 PROCEDURE — 15824 RHYTIDECTOMY FOREHEAD: CPT

## 2023-03-04 PROCEDURE — 21172 RCNST SUPR-LAT ORB RM&LW FHD: CPT

## 2023-03-04 PROCEDURE — 21125 AUGMENTATION MNDBLR PROSTC: CPT | Mod: 59

## 2023-03-04 PROCEDURE — 21209 REDUCTION OF FACIAL BONES: CPT

## 2023-03-04 PROCEDURE — XXXXX: CPT | Mod: 1L

## 2023-03-04 PROCEDURE — 21127 AUGMENTATION MNDBLR B1 GRF: CPT

## 2023-03-04 DEVICE — SCREW BONE RESORB 2.1X4MM SONIC PIN: Type: IMPLANTABLE DEVICE | Status: FUNCTIONAL

## 2023-03-04 DEVICE — SCREW MAXDRIVE MINI 2X7MM: Type: IMPLANTABLE DEVICE | Status: FUNCTIONAL

## 2023-03-04 DEVICE — SCREW EMERG CROSS DRIVE TI 2X9: Type: IMPLANTABLE DEVICE | Status: FUNCTIONAL

## 2023-03-04 DEVICE — MESH RESORB RXG 51X51MM 0.6MM: Type: IMPLANTABLE DEVICE | Status: FUNCTIONAL

## 2023-03-04 DEVICE — PLATE SMITH GENIOPLASTY 2X3MM: Type: IMPLANTABLE DEVICE | Status: FUNCTIONAL

## 2023-03-04 DEVICE — PIN SONIC RX 2.1X4MM: Type: IMPLANTABLE DEVICE | Status: FUNCTIONAL

## 2023-03-04 RX ORDER — MONTELUKAST 4 MG/1
10 TABLET, CHEWABLE ORAL AT BEDTIME
Refills: 0 | Status: DISCONTINUED | OUTPATIENT
Start: 2023-03-04 | End: 2023-03-06

## 2023-03-04 RX ORDER — HYDROMORPHONE HYDROCHLORIDE 2 MG/ML
0.5 INJECTION INTRAMUSCULAR; INTRAVENOUS; SUBCUTANEOUS
Refills: 0 | Status: DISCONTINUED | OUTPATIENT
Start: 2023-03-04 | End: 2023-03-04

## 2023-03-04 RX ORDER — SODIUM CHLORIDE 9 MG/ML
1000 INJECTION, SOLUTION INTRAVENOUS
Refills: 0 | Status: DISCONTINUED | OUTPATIENT
Start: 2023-03-04 | End: 2023-03-05

## 2023-03-04 RX ORDER — CEFAZOLIN SODIUM 1 G
2000 VIAL (EA) INJECTION EVERY 8 HOURS
Refills: 0 | Status: DISCONTINUED | OUTPATIENT
Start: 2023-03-04 | End: 2023-03-06

## 2023-03-04 RX ORDER — DEXAMETHASONE 0.5 MG/5ML
10 ELIXIR ORAL EVERY 12 HOURS
Refills: 0 | Status: DISCONTINUED | OUTPATIENT
Start: 2023-03-04 | End: 2023-03-06

## 2023-03-04 RX ORDER — SODIUM CHLORIDE 0.65 %
1 AEROSOL, SPRAY (ML) NASAL
Refills: 0 | Status: DISCONTINUED | OUTPATIENT
Start: 2023-03-04 | End: 2023-03-06

## 2023-03-04 RX ORDER — ACETAMINOPHEN 500 MG
650 TABLET ORAL EVERY 6 HOURS
Refills: 0 | Status: DISCONTINUED | OUTPATIENT
Start: 2023-03-05 | End: 2023-03-06

## 2023-03-04 RX ORDER — OXYCODONE HYDROCHLORIDE 5 MG/1
10 TABLET ORAL EVERY 4 HOURS
Refills: 0 | Status: DISCONTINUED | OUTPATIENT
Start: 2023-03-04 | End: 2023-03-06

## 2023-03-04 RX ORDER — ACETAMINOPHEN 500 MG
1000 TABLET ORAL ONCE
Refills: 0 | Status: COMPLETED | OUTPATIENT
Start: 2023-03-04 | End: 2023-03-04

## 2023-03-04 RX ORDER — ONDANSETRON 8 MG/1
4 TABLET, FILM COATED ORAL EVERY 6 HOURS
Refills: 0 | Status: DISCONTINUED | OUTPATIENT
Start: 2023-03-04 | End: 2023-03-06

## 2023-03-04 RX ORDER — ACETAMINOPHEN 500 MG
1000 TABLET ORAL ONCE
Refills: 0 | Status: COMPLETED | OUTPATIENT
Start: 2023-03-05 | End: 2023-03-05

## 2023-03-04 RX ORDER — BENZOCAINE AND MENTHOL 5; 1 G/100ML; G/100ML
1 LIQUID ORAL EVERY 4 HOURS
Refills: 0 | Status: DISCONTINUED | OUTPATIENT
Start: 2023-03-04 | End: 2023-03-06

## 2023-03-04 RX ORDER — CHLORHEXIDINE GLUCONATE 213 G/1000ML
15 SOLUTION TOPICAL
Refills: 0 | Status: DISCONTINUED | OUTPATIENT
Start: 2023-03-04 | End: 2023-03-06

## 2023-03-04 RX ORDER — OXYCODONE HYDROCHLORIDE 5 MG/1
5 TABLET ORAL EVERY 4 HOURS
Refills: 0 | Status: DISCONTINUED | OUTPATIENT
Start: 2023-03-04 | End: 2023-03-06

## 2023-03-04 RX ADMIN — Medication 102 MILLIGRAM(S): at 17:36

## 2023-03-04 RX ADMIN — HYDROMORPHONE HYDROCHLORIDE 0.5 MILLIGRAM(S): 2 INJECTION INTRAMUSCULAR; INTRAVENOUS; SUBCUTANEOUS at 16:44

## 2023-03-04 RX ADMIN — Medication 1 MILLIGRAM(S): at 17:36

## 2023-03-04 RX ADMIN — Medication 1000 MILLIGRAM(S): at 21:32

## 2023-03-04 RX ADMIN — HYDROMORPHONE HYDROCHLORIDE 0.5 MILLIGRAM(S): 2 INJECTION INTRAMUSCULAR; INTRAVENOUS; SUBCUTANEOUS at 16:01

## 2023-03-04 RX ADMIN — Medication 400 MILLIGRAM(S): at 21:02

## 2023-03-04 RX ADMIN — Medication 100 MILLIGRAM(S): at 23:02

## 2023-03-04 RX ADMIN — HYDROMORPHONE HYDROCHLORIDE 0.5 MILLIGRAM(S): 2 INJECTION INTRAMUSCULAR; INTRAVENOUS; SUBCUTANEOUS at 17:34

## 2023-03-04 RX ADMIN — OXYCODONE HYDROCHLORIDE 10 MILLIGRAM(S): 5 TABLET ORAL at 23:54

## 2023-03-04 RX ADMIN — OXYCODONE HYDROCHLORIDE 10 MILLIGRAM(S): 5 TABLET ORAL at 22:54

## 2023-03-04 NOTE — BRIEF OPERATIVE NOTE - NSICDXBRIEFPROCEDURE_GEN_ALL_CORE_FT
PROCEDURES:  Complete rhinoplasty 04-Mar-2023 15:00:31  Randal Ramirez  Cranioplasty, with bone flap replacement 04-Mar-2023 15:01:00  Randal Ramirez  Genioplasty 04-Mar-2023 15:01:13  Randal Ramirez

## 2023-03-04 NOTE — PROVIDER CONTACT NOTE (OTHER) - ASSESSMENT
pt A&Ox4. pt v/s on admission pulse 110, /70, Temp 98.9F, Resp 18, O2 98% on face tent. pt complains of mild pain in surgical incision sites. denies chest pain, SOB, nausea and vomiting. pt denies any tightness in throat/neck. On report from recovery pt was also tachy after surgery and team was aware and cleared pt for regional unit.

## 2023-03-04 NOTE — PROVIDER CONTACT NOTE (OTHER) - ACTION/TREATMENT ORDERED:
MD aware of pt pulse. MD states pt can have ativan order and tylenol order when next due, but no other medication intervention at this time. will continue to monitor.

## 2023-03-04 NOTE — PRE-ANESTHESIA EVALUATION ADULT - NSANTHPMHFT_GEN_ALL_CORE
Cardiac: Denies HNT, HLD, MI/Angina/Heart Failure, Arrhythmia, Murmur/Valvular Disorder. >4 METS  Pulmonary: Denies COPD, ALEJANDRA, Asthma  Renal: Denies kidney dysfunction  Hepatic: Denies liver dysfunction  Gastrointestinal: Denies GERD/IBD  Endocrine: Denies DM or thyroid dysfunction  Neurologic: Denies stroke/seizure disorder.  Hematologic: Denies anemia, blood clotting disorder, blood thinning medication.    PSH: Vaginoplasty 2021/2022. Breast augmentation 2022.

## 2023-03-04 NOTE — PRE-ANESTHESIA EVALUATION ADULT - NSPROPOSEDPROCEDFT_GEN_ALL_CORE
Facial Feminization Surgery: Frontal sinus set back, Browlift, Hairline lowering, B/L Supraorbital bar recountouring, bilateral mandibular angle reduction/ostectomy, osseous genioplasty narrowing, rhinoplasty, SMR, Cartilage grafting, submental fat excision, buccal fat removal, platysmaplasty, tracheal shave.

## 2023-03-05 LAB — HBV SURFACE AG SER-ACNC: SIGNIFICANT CHANGE UP

## 2023-03-05 RX ORDER — SENNA PLUS 8.6 MG/1
1 TABLET ORAL
Refills: 0 | Status: DISCONTINUED | OUTPATIENT
Start: 2023-03-05 | End: 2023-03-06

## 2023-03-05 RX ADMIN — CHLORHEXIDINE GLUCONATE 15 MILLILITER(S): 213 SOLUTION TOPICAL at 08:07

## 2023-03-05 RX ADMIN — CHLORHEXIDINE GLUCONATE 15 MILLILITER(S): 213 SOLUTION TOPICAL at 22:38

## 2023-03-05 RX ADMIN — OXYCODONE HYDROCHLORIDE 10 MILLIGRAM(S): 5 TABLET ORAL at 04:13

## 2023-03-05 RX ADMIN — Medication 102 MILLIGRAM(S): at 18:16

## 2023-03-05 RX ADMIN — Medication 102 MILLIGRAM(S): at 05:32

## 2023-03-05 RX ADMIN — OXYCODONE HYDROCHLORIDE 10 MILLIGRAM(S): 5 TABLET ORAL at 03:13

## 2023-03-05 RX ADMIN — Medication 1 MILLIGRAM(S): at 18:16

## 2023-03-05 RX ADMIN — OXYCODONE HYDROCHLORIDE 10 MILLIGRAM(S): 5 TABLET ORAL at 21:15

## 2023-03-05 RX ADMIN — Medication 1 MILLIGRAM(S): at 06:58

## 2023-03-05 RX ADMIN — Medication 1000 MILLIGRAM(S): at 05:30

## 2023-03-05 RX ADMIN — Medication 100 MILLIGRAM(S): at 06:57

## 2023-03-05 RX ADMIN — CHLORHEXIDINE GLUCONATE 15 MILLILITER(S): 213 SOLUTION TOPICAL at 13:25

## 2023-03-05 RX ADMIN — Medication 1 MILLIGRAM(S): at 13:19

## 2023-03-05 RX ADMIN — Medication 100 MILLIGRAM(S): at 22:39

## 2023-03-05 RX ADMIN — Medication 400 MILLIGRAM(S): at 04:51

## 2023-03-05 RX ADMIN — Medication 100 MILLIGRAM(S): at 14:26

## 2023-03-05 RX ADMIN — OXYCODONE HYDROCHLORIDE 10 MILLIGRAM(S): 5 TABLET ORAL at 22:15

## 2023-03-05 RX ADMIN — SODIUM CHLORIDE 75 MILLILITER(S): 9 INJECTION, SOLUTION INTRAVENOUS at 01:18

## 2023-03-05 RX ADMIN — Medication 1 MILLIGRAM(S): at 01:17

## 2023-03-05 NOTE — PATIENT PROFILE ADULT - FALL HARM RISK - HARM RISK INTERVENTIONS

## 2023-03-06 ENCOUNTER — TRANSCRIPTION ENCOUNTER (OUTPATIENT)
Age: 28
End: 2023-03-06

## 2023-03-06 VITALS
SYSTOLIC BLOOD PRESSURE: 98 MMHG | DIASTOLIC BLOOD PRESSURE: 62 MMHG | HEART RATE: 88 BPM | OXYGEN SATURATION: 100 % | RESPIRATION RATE: 20 BRPM

## 2023-03-06 PROCEDURE — 87389 HIV-1 AG W/HIV-1&-2 AB AG IA: CPT

## 2023-03-06 PROCEDURE — C1713: CPT

## 2023-03-06 PROCEDURE — C1889: CPT

## 2023-03-06 PROCEDURE — 86803 HEPATITIS C AB TEST: CPT

## 2023-03-06 PROCEDURE — 87340 HEPATITIS B SURFACE AG IA: CPT

## 2023-03-06 RX ADMIN — Medication 1 MILLIGRAM(S): at 13:06

## 2023-03-06 RX ADMIN — CHLORHEXIDINE GLUCONATE 15 MILLILITER(S): 213 SOLUTION TOPICAL at 13:32

## 2023-03-06 RX ADMIN — Medication 100 MILLIGRAM(S): at 08:55

## 2023-03-06 RX ADMIN — OXYCODONE HYDROCHLORIDE 10 MILLIGRAM(S): 5 TABLET ORAL at 10:05

## 2023-03-06 RX ADMIN — OXYCODONE HYDROCHLORIDE 10 MILLIGRAM(S): 5 TABLET ORAL at 09:05

## 2023-03-06 RX ADMIN — Medication 1 MILLIGRAM(S): at 06:58

## 2023-03-06 RX ADMIN — OXYCODONE HYDROCHLORIDE 10 MILLIGRAM(S): 5 TABLET ORAL at 04:15

## 2023-03-06 RX ADMIN — Medication 1 MILLIGRAM(S): at 00:00

## 2023-03-06 RX ADMIN — Medication 102 MILLIGRAM(S): at 06:58

## 2023-03-06 RX ADMIN — OXYCODONE HYDROCHLORIDE 10 MILLIGRAM(S): 5 TABLET ORAL at 14:33

## 2023-03-06 NOTE — DISCHARGE NOTE NURSING/CASE MANAGEMENT/SOCIAL WORK - PATIENT PORTAL LINK FT
You can access the FollowMyHealth Patient Portal offered by [facility] by registering at the following website: [email or web address]. By joining [employer]’s FollowMyHealth portal, you will also be able to view your health information using other applications (apps) compatible with our system.

## 2023-03-06 NOTE — DISCHARGE NOTE NURSING/CASE MANAGEMENT/SOCIAL WORK - NSDCPEFALRISK_GEN_ALL_CORE
For information on Fall & Injury Prevention, visit: [email or web address] OR  [email or web address] OR  https://www.cdc.gov/steadi/patient.html

## 2023-03-06 NOTE — DISCHARGE NOTE PROVIDER - HOSPITAL COURSE
Patient is a 27y year old female with history of gender dysphoria disorder who underwent facial feminization surgery with Dr. Delvalle on 1/20/22. Patient underwent frontal sinus and bilateral orbital rim contouring, brow lift, genioplasty, mandible angle reduction, rhinoplasty and platysmaplasty. She recovered well in PACU and was transferred to surgical floor for observation for two nights. Patient was tolerating clear liquid PO and advanced to full liquid PO on POD 1. Dressings were removed and changed and MILY drain was taken out prior to discharge. The patients vitals were stable and she was agreeable with plan for discharge and out patient follow up. Prescription medications sent to pharmacy.

## 2023-03-06 NOTE — DISCHARGE NOTE PROVIDER - CARE PROVIDER_API CALL
Alexander Delvalle (MD)  Plastic Surgery; Surgery  [address], [address]  [place] [address]  Phone: [phone]  Fax: [phone]  Follow Up Time:

## 2023-03-06 NOTE — PROGRESS NOTE ADULT - SUBJECTIVE AND OBJECTIVE BOX
SUBJECTIVE: Patient seen and examined bedside. Pt reports feeling well with no acute complaints. Aware of plan for discharge home today.     ceFAZolin   IVPB 2000 milliGRAM(s) IV Intermittent every 8 hours      Vital Signs Last 24 Hrs  T(C): 36.7 (06 Mar 2023 05:20), Max: 36.8 (05 Mar 2023 20:23)  T(F): 98.1 (06 Mar 2023 05:20), Max: 98.2 (05 Mar 2023 20:23)  HR: 80 (06 Mar 2023 05:20) (73 - 84)  BP: 115/80 (06 Mar 2023 05:20) (93/56 - 117/79)  BP(mean): 68 (06 Mar 2023 05:00) (68 - 68)  RR: 17 (06 Mar 2023 05:20) (17 - 18)  SpO2: 99% (06 Mar 2023 05:20) (99% - 100%)    Parameters below as of 06 Mar 2023 05:20  Patient On (Oxygen Delivery Method): face tent      I&O's Detail    05 Mar 2023 07:01  -  06 Mar 2023 07:00  --------------------------------------------------------  IN:    Oral Fluid: 980 mL  Total IN: 980 mL    OUT:    Drain (mL): 15 mL    Drain (mL): 50 mL    Voided (mL): 2600 mL  Total OUT: 2665 mL    Total NET: -1685 mL      06 Mar 2023 07:01  -  06 Mar 2023 09:23  --------------------------------------------------------  IN:    Oral Fluid: 240 mL  Total IN: 240 mL    OUT:    Voided (mL): 500 mL  Total OUT: 500 mL    Total NET: -260 mL          General: NAD, resting comfortably in bed  HEENT: expected postoperative facial edema, incisions CDI, airway intact, nasal bridge in place, dressings changed, JPs with SS output, now removed  C/V: NSR  Pulm: Nonlabored breathing, no respiratory distress  Abd: soft, NT/ND.  Extrem: WWP, no edema, SCDs in place        LABS:                RADIOLOGY & ADDITIONAL STUDIES:  
Patient is 1 day post facial feminization surgery. She complains of surgical site pain. She is having difficulty speaking due to a "swollen lip" so prefers writing text with a pen and paper.     On exam, she has a reticular dressing in place, as well as a nasal splint. The scalp and intra-oral drains each have sanguinous character.

## 2023-03-06 NOTE — PROGRESS NOTE ADULT - ASSESSMENT
27F with gender dysphoria now POD2 from [facility]. Post-operative course without complication. JPs removed and dressings replaced this AM.     Plan:  Discharge home today
Healing as expected. Continue drains, continue current pain, antibiotic, and steroid regimen. Clear liquid diet. Expect to change dressing tomorrow.

## 2023-03-06 NOTE — DISCHARGE NOTE PROVIDER - NSDCMRMEDTOKEN_GEN_ALL_CORE_FT
estradiol valerate 40 mg/mL intramuscular solution: intramuscular every 2 weeks  zafirlukast 10 mg oral tablet: 1 tab(s) orally 2 times a day

## 2023-03-06 NOTE — DISCHARGE NOTE PROVIDER - NSDCCPTREATMENT_GEN_ALL_CORE_FT
PRINCIPAL PROCEDURE  Procedure: Cranioplasty, with bone flap replacement  Findings and Treatment:       SECONDARY PROCEDURE  Procedure: Genioplasty  Findings and Treatment:     Procedure: Complete rhinoplasty  Findings and Treatment:

## 2023-03-06 NOTE — DISCHARGE NOTE PROVIDER - NSDCFUADDINST_GEN_ALL_CORE_FT
Medications:  •A prescription for pain medication will be e-prescribed to your pharmacy.  Please take this as needed for severe pain.  •Do not drive while taking the prescribed pain medication.  •Do not take pain medication on an empty stomach, this may make you nauseous.  •Constipation is not uncommon when taking prescription pain medication.  You may take an OTC stool softener like Dulcolax or Sennakot to help with this.  •You may take over the counter pain medication such as Tylenol (acetaminophen) or Advil (ibuprofen) for pain.  •Please use Peridex rinse- swish and spit twice daily for 7 days.  Activity:  •No bending or heavy lifting.  Keep your head above the level of your heart.  At your first post-op visit we will assess how you are doing and will adjust the restrictions as needed.  •Sleep elevated on at least 2 pillows  Diet:  •	Intraoral incisions:  Clear liquid diet for first 24 hours then may advance to soft diet.  Avoid overly hot, cold, spicy, or acidic foods.  •	Do not drink alcohol in the immediate postoperative period and while taking prescription pain medication.  Wound Care:  • Gently brush teeth and keep mouth clean after eating.  At your first post-op visit we will assess the wound and instruct you of any care needed.  •You may shower.  Let soap and water run over the incisions and pat dry.  •You may gently apply ice pack, or frozen peas to the nose and eye regions.  Apply this for the first 48-72 hours.  20 minutes on, 20 minutes off.  •If you were given a facial/chin garment, please wear this 24/7 until your first postop visit

## 2023-03-09 RX ORDER — OXYCODONE 5 MG/1
5 TABLET ORAL
Qty: 12 | Refills: 0 | Status: ACTIVE | COMMUNITY
Start: 2023-03-09 | End: 1900-01-01

## 2023-03-09 RX ORDER — GABAPENTIN 300 MG/1
300 CAPSULE ORAL TWICE DAILY
Qty: 12 | Refills: 0 | Status: ACTIVE | COMMUNITY
Start: 2023-03-09 | End: 1900-01-01

## 2023-03-10 ENCOUNTER — APPOINTMENT (OUTPATIENT)
Dept: PLASTIC SURGERY | Facility: CLINIC | Age: 28
End: 2023-03-10
Payer: MEDICAID

## 2023-03-10 VITALS
DIASTOLIC BLOOD PRESSURE: 70 MMHG | HEART RATE: 98 BPM | HEIGHT: 67 IN | SYSTOLIC BLOOD PRESSURE: 107 MMHG | OXYGEN SATURATION: 97 % | TEMPERATURE: 98.2 F

## 2023-03-10 PROCEDURE — 99024 POSTOP FOLLOW-UP VISIT: CPT

## 2023-03-17 ENCOUNTER — APPOINTMENT (OUTPATIENT)
Dept: PLASTIC SURGERY | Facility: CLINIC | Age: 28
End: 2023-03-17
Payer: MEDICAID

## 2023-03-17 VITALS
DIASTOLIC BLOOD PRESSURE: 78 MMHG | HEART RATE: 74 BPM | OXYGEN SATURATION: 96 % | SYSTOLIC BLOOD PRESSURE: 119 MMHG | TEMPERATURE: 98.2 F

## 2023-03-17 PROCEDURE — 99024 POSTOP FOLLOW-UP VISIT: CPT

## 2023-03-17 NOTE — DISCUSSION/SUMMARY
[FreeTextEntry1] : S/P Facial Feminization Surgery (Brow, Jawline)\par Patient is without complains and is happy with the result;\par Swelling is still present but improving;\par Incisions are healed\par Hairline with dissolvable sutures and eschars\par Advised how to clean the eschars each day\par Remaining sutures removed\par Some chin numbness\par Advised to sleep with head at 30 degrees to reduce swelling;\par Tolerating a soft diet; Advance to regular diet;\par Resume normal activities; \par Take Motrin 400mg prn\par Continue with Jawbra at nighttime \par Start nasal saline spray BID \par Follow up in 3 weeks\par

## 2023-03-21 DIAGNOSIS — F64.0 TRANSSEXUALISM: ICD-10-CM

## 2023-03-22 RX ORDER — CHLORHEXIDINE GLUCONATE, 0.12% ORAL RINSE 1.2 MG/ML
0.12 SOLUTION DENTAL
Qty: 1 | Refills: 0 | Status: ACTIVE | COMMUNITY
Start: 2023-03-22 | End: 1900-01-01

## 2023-03-24 ENCOUNTER — APPOINTMENT (OUTPATIENT)
Dept: PLASTIC SURGERY | Facility: CLINIC | Age: 28
End: 2023-03-24
Payer: MEDICAID

## 2023-03-24 VITALS
OXYGEN SATURATION: 100 % | HEART RATE: 76 BPM | SYSTOLIC BLOOD PRESSURE: 111 MMHG | BODY MASS INDEX: 21.97 KG/M2 | WEIGHT: 140 LBS | TEMPERATURE: 98.3 F | DIASTOLIC BLOOD PRESSURE: 74 MMHG | HEIGHT: 67 IN

## 2023-03-24 PROCEDURE — 99024 POSTOP FOLLOW-UP VISIT: CPT

## 2023-03-26 NOTE — DISCUSSION/SUMMARY
[FreeTextEntry1] : S/P Facial Feminization Surgery on 3/4/2023 (Brow, Nose, Jawline)\par Swelling is still present especially in the neck but improving;\par Incisions are healed\par Hairline with dissolvable sutures and eschars\par Advised how to clean the eschars each day\par Remaining sutures removed\par Some chin numbness\par Advised to sleep with head at 30 degrees to reduce swelling;\par Tolerating a soft diet; Advance to regular diet;\par Resume normal activities; \par Take Motrin 400mg prn\par Continue with Jawbra at nighttime \par Start nasal saline spray BID \par Follow up in 6 weeks\par \par

## 2023-04-07 PROBLEM — Z00.00 ENCOUNTER FOR PREVENTIVE HEALTH EXAMINATION: Status: ACTIVE | Noted: 2023-04-07

## 2023-04-14 ENCOUNTER — APPOINTMENT (OUTPATIENT)
Dept: PLASTIC SURGERY | Facility: CLINIC | Age: 28
End: 2023-04-14
Payer: MEDICAID

## 2023-04-14 VITALS
BODY MASS INDEX: 21.97 KG/M2 | SYSTOLIC BLOOD PRESSURE: 123 MMHG | TEMPERATURE: 98.2 F | HEART RATE: 85 BPM | WEIGHT: 140 LBS | HEIGHT: 67 IN | OXYGEN SATURATION: 100 % | DIASTOLIC BLOOD PRESSURE: 77 MMHG

## 2023-04-14 PROCEDURE — 99024 POSTOP FOLLOW-UP VISIT: CPT

## 2023-04-15 NOTE — DISCUSSION/SUMMARY
[FreeTextEntry1] : S/P Facial Feminization Surgery on 2023 (Brow, Nose, Jawline)\par Improved, feels better\par Swelling is still present but improving;\par Incisions are healed\par Hairline with dissolvable sutures and eschars\par Advised how to clean the eschars each day\par Remaining sutures removed\par Some chin numbness\par Advised to sleep with head at 30 degrees to reduce swelling;\par Tolerating a soft diet; Advance to regular diet;\par Resume normal activities; \par Take Motrin 400mg prn\par Continue with Jawbra at nighttime \par Start nasal saline spray BID \par Follow up in 6 weeks\par \par

## 2023-04-21 ENCOUNTER — APPOINTMENT (OUTPATIENT)
Dept: PLASTIC SURGERY | Facility: CLINIC | Age: 28
End: 2023-04-21

## 2023-06-02 ENCOUNTER — APPOINTMENT (OUTPATIENT)
Dept: PLASTIC SURGERY | Facility: CLINIC | Age: 28
End: 2023-06-02

## 2023-06-02 VITALS
BODY MASS INDEX: 22.13 KG/M2 | OXYGEN SATURATION: 99 % | HEART RATE: 63 BPM | TEMPERATURE: 98.2 F | HEIGHT: 67 IN | WEIGHT: 141 LBS | SYSTOLIC BLOOD PRESSURE: 105 MMHG | DIASTOLIC BLOOD PRESSURE: 62 MMHG

## 2023-06-17 NOTE — HISTORY OF PRESENT ILLNESS
[FreeTextEntry1] : Roxie is a 27 year old transgender female patient that presents to the office today for a post operative evaluation s/p facial feminization surgery (brow, nose, jawline, submental fat excision, buccal fat removal, tracheal shave, platysmaplasty) on 3/4/2023. Patient denies having any significant concerns or complaints.\par

## 2023-06-17 NOTE — PHYSICAL EXAM
[de-identified] : Alert, calm, cooperative.\par  [de-identified] : Oral and nasal incisions with no signs of wound dehiscence or fluctuance. Mild edema but improving significantly [de-identified] : Coronal incision with no signs of wound dehiscence or fluctuance. Mild edema but improving significantly\par  [de-identified] : Tracheal shave incision with no signs of wound dehiscence or fluctuance. Mild edema but improving significantly [de-identified] : Respirations even and unlabored.\par

## 2023-07-07 NOTE — ED ADULT NURSE NOTE - BREATHING, MLM
Spontaneous, unlabored and symmetrical Qbrexza Pregnancy And Lactation Text: There is no available data on Qbrexza use in pregnant women.  There is no available data on Qbrexza use in lactation.

## 2023-08-01 LAB
SHBG SERPL-SCNC: 81.1 NMOL/L
TESTOST SERPL-MCNC: <2.5 NG/DL

## 2023-09-22 ENCOUNTER — APPOINTMENT (OUTPATIENT)
Dept: PLASTIC SURGERY | Facility: CLINIC | Age: 28
End: 2023-09-22
Payer: COMMERCIAL

## 2023-09-22 VITALS
HEART RATE: 60 BPM | BODY MASS INDEX: 21.97 KG/M2 | HEIGHT: 67 IN | SYSTOLIC BLOOD PRESSURE: 118 MMHG | DIASTOLIC BLOOD PRESSURE: 75 MMHG | TEMPERATURE: 98.3 F | WEIGHT: 140 LBS | OXYGEN SATURATION: 99 %

## 2023-09-22 DIAGNOSIS — Z09 ENCOUNTER FOR FOLLOW-UP EXAMINATION AFTER COMPLETED TREATMENT FOR CONDITIONS OTHER THAN MALIGNANT NEOPLASM: ICD-10-CM

## 2023-09-22 DIAGNOSIS — F64.0 TRANSSEXUALISM: ICD-10-CM

## 2023-09-22 PROCEDURE — XXXXX: CPT

## 2023-09-25 PROBLEM — Z09 POSTOPERATIVE EXAMINATION: Status: ACTIVE | Noted: 2023-03-12

## 2023-09-25 PROBLEM — F64.0 GENDER DYSPHORIA IN ADULT: Status: ACTIVE | Noted: 2022-06-02

## 2024-03-25 NOTE — ED PROVIDER NOTE - NSTIMEPROVIDERCAREINITIATE_GEN_ER
[] : no respiratory distress [Heart Rate And Rhythm] : heart rate was normal and rhythm regular [Respiration, Rhythm And Depth] : normal respiratory rhythm and effort [Examination Of The Chest] : the chest was normal in appearance [Chest Visual Inspection Thoracic Asymmetry] : no chest asymmetry [Diminished Respiratory Excursion] : normal chest expansion 02-Aug-2018 06:11

## 2024-04-06 RX ORDER — ZAFIRLUKAST 10 MG/1
1 TABLET, COATED ORAL
Qty: 0 | Refills: 0 | DISCHARGE

## 2024-06-17 NOTE — ASU PREOP CHECKLIST - NSBLOODTRANS_GEN_A_CORE_SIUH
Per patient's chart this medication has been approved.      
Prior auth follow up for QUEtiapine Fumarate scanned into media    GRIJALVA:  [id number]  :  Saul  :  [id number]  
no...

## 2024-11-04 ENCOUNTER — NON-APPOINTMENT (OUTPATIENT)
Age: 29
End: 2024-11-04

## 2024-11-04 ENCOUNTER — APPOINTMENT (OUTPATIENT)
Dept: PLASTIC SURGERY | Facility: CLINIC | Age: 29
End: 2024-11-04
Payer: COMMERCIAL

## 2024-11-04 DIAGNOSIS — F64.0 TRANSSEXUALISM: ICD-10-CM

## 2024-11-04 PROCEDURE — 99214 OFFICE O/P EST MOD 30 MIN: CPT

## 2024-12-04 ENCOUNTER — APPOINTMENT (OUTPATIENT)
Dept: GASTROENTEROLOGY | Facility: CLINIC | Age: 29
End: 2024-12-04

## 2024-12-13 ENCOUNTER — APPOINTMENT (OUTPATIENT)
Dept: PLASTIC SURGERY | Facility: CLINIC | Age: 29
End: 2024-12-13

## 2025-01-27 ENCOUNTER — LABORATORY RESULT (OUTPATIENT)
Age: 30
End: 2025-01-27

## 2025-01-27 ENCOUNTER — APPOINTMENT (OUTPATIENT)
Dept: TRANSGENDER CARE | Facility: CLINIC | Age: 30
End: 2025-01-27

## 2025-01-27 DIAGNOSIS — Z11.59 ENCOUNTER FOR SCREENING FOR OTHER VIRAL DISEASES: ICD-10-CM

## 2025-01-27 DIAGNOSIS — H69.90 UNSPECIFIED EUSTACHIAN TUBE DISORDER, UNSPECIFIED EAR: ICD-10-CM

## 2025-01-27 DIAGNOSIS — H91.91 UNSPECIFIED HEARING LOSS, RIGHT EAR: ICD-10-CM

## 2025-01-27 DIAGNOSIS — Z83.3 FAMILY HISTORY OF DIABETES MELLITUS: ICD-10-CM

## 2025-01-27 DIAGNOSIS — E55.9 VITAMIN D DEFICIENCY, UNSPECIFIED: ICD-10-CM

## 2025-01-27 DIAGNOSIS — Z13.220 ENCOUNTER FOR SCREENING FOR LIPOID DISORDERS: ICD-10-CM

## 2025-01-27 DIAGNOSIS — Z11.3 ENCOUNTER FOR SCREENING FOR INFECTIONS WITH A PREDOMINANTLY SEXUAL MODE OF TRANSMISSION: ICD-10-CM

## 2025-01-27 DIAGNOSIS — Z78.9 OTHER SPECIFIED HEALTH STATUS: ICD-10-CM

## 2025-01-27 PROCEDURE — 99215 OFFICE O/P EST HI 40 MIN: CPT | Mod: 25

## 2025-01-29 LAB
ALBUMIN SERPL ELPH-MCNC: 4.4 G/DL
ALP BLD-CCNC: 75 U/L
ALT SERPL-CCNC: 14 U/L
ANION GAP SERPL CALC-SCNC: 11 MMOL/L
APPEARANCE: CLEAR
AST SERPL-CCNC: 16 U/L
BILIRUB SERPL-MCNC: 0.2 MG/DL
BILIRUBIN URINE: NEGATIVE
BLOOD URINE: NEGATIVE
BUN SERPL-MCNC: 10 MG/DL
C TRACH RRNA SPEC QL NAA+PROBE: NOT DETECTED
C TRACH RRNA SPEC QL NAA+PROBE: NOT DETECTED
CALCIUM SERPL-MCNC: 8.8 MG/DL
CHLORIDE SERPL-SCNC: 107 MMOL/L
CHOLEST SERPL-MCNC: 152 MG/DL
CO2 SERPL-SCNC: 23 MMOL/L
COLOR: YELLOW
CREAT SERPL-MCNC: 0.6 MG/DL
EGFR: 134 ML/MIN/1.73M2
FSH SERPL-MCNC: 3.6 IU/L
GLUCOSE QUALITATIVE U: NEGATIVE MG/DL
GLUCOSE SERPL-MCNC: 103 MG/DL
HBV CORE IGG+IGM SER QL: NONREACTIVE
HBV SURFACE AB SERPL IA-ACNC: >1000 MIU/ML
HBV SURFACE AG SER QL: NONREACTIVE
HCV AB SER QL: NONREACTIVE
HCV S/CO RATIO: 0.09 S/CO
HDLC SERPL-MCNC: 79 MG/DL
HEPATITIS A IGG ANTIBODY: REACTIVE
HIV1+2 AB SPEC QL IA.RAPID: NONREACTIVE
KETONES URINE: NEGATIVE MG/DL
LDLC SERPL CALC-MCNC: 64 MG/DL
LEUKOCYTE ESTERASE URINE: NEGATIVE
LH SERPL-ACNC: 8.9 IU/L
N GONORRHOEA RRNA SPEC QL NAA+PROBE: NOT DETECTED
N GONORRHOEA RRNA SPEC QL NAA+PROBE: NOT DETECTED
NITRITE URINE: NEGATIVE
NONHDLC SERPL-MCNC: 73 MG/DL
PH URINE: 6
POTASSIUM SERPL-SCNC: 4.3 MMOL/L
PROLACTIN SERPL-MCNC: 15.1 NG/ML
PROT SERPL-MCNC: 6.5 G/DL
PROTEIN URINE: NEGATIVE MG/DL
SHBG SERPL-SCNC: 146 NMOL/L
SODIUM SERPL-SCNC: 141 MMOL/L
SOURCE AMPLIFICATION: NORMAL
SOURCE ORAL: NORMAL
SPECIFIC GRAVITY URINE: 1.01
T PALLIDUM AB SER QL IA: POSITIVE
TESTOST SERPL-MCNC: 5.2 NG/DL
TRIGL SERPL-MCNC: 41 MG/DL
TSH SERPL-ACNC: 1.04 UIU/ML
UROBILINOGEN URINE: 0.2 MG/DL

## 2025-01-31 DIAGNOSIS — D64.9 ANEMIA, UNSPECIFIED: ICD-10-CM

## 2025-01-31 LAB
25(OH)D3 SERPL-MCNC: 11.7 NG/ML
BASOPHILS # BLD AUTO: 0.03 K/UL
BASOPHILS NFR BLD AUTO: 0.5 %
EOSINOPHIL # BLD AUTO: 0.31 K/UL
EOSINOPHIL NFR BLD AUTO: 4.9 %
ESTIMATED AVERAGE GLUCOSE: 120 MG/DL
ESTRADIOL SERPL-MCNC: 203 PG/ML
HBA1C MFR BLD HPLC: 5.8 %
HCT VFR BLD CALC: 34.9 %
HGB BLD-MCNC: 11.5 G/DL
IMM GRANULOCYTES NFR BLD AUTO: 0.2 %
LYMPHOCYTES # BLD AUTO: 1.35 K/UL
LYMPHOCYTES NFR BLD AUTO: 21.4 %
MAN DIFF?: NORMAL
MCHC RBC-ENTMCNC: 31.3 PG
MCHC RBC-ENTMCNC: 33 G/DL
MCV RBC AUTO: 95.1 FL
MONOCYTES # BLD AUTO: 0.4 K/UL
MONOCYTES NFR BLD AUTO: 6.3 %
NEUTROPHILS # BLD AUTO: 4.2 K/UL
NEUTROPHILS NFR BLD AUTO: 66.7 %
PLATELET # BLD AUTO: 160 K/UL
RBC # BLD: 3.67 M/UL
RBC # FLD: 13.6 %
WBC # FLD AUTO: 6.3 K/UL

## 2025-01-31 RX ORDER — ERGOCALCIFEROL 1.25 MG/1
1.25 MG CAPSULE, LIQUID FILLED ORAL
Qty: 8 | Refills: 0 | Status: ACTIVE | COMMUNITY
Start: 2025-01-31 | End: 1900-01-01

## 2025-02-03 LAB
FERRITIN SERPL-MCNC: 30 NG/ML
FOLATE SERPL-MCNC: 6 NG/ML
IRON SATN MFR SERPL: 20 %
IRON SERPL-MCNC: 64 UG/DL
MONOMERIC PROLACTIN (ICMA)*: 14.5 NG/ML
PERCENT MACROPROLACTIN: 19 %
PROLACTIN, SERUM (ICMA)*: 17.9 NG/ML
TIBC SERPL-MCNC: 321 UG/DL
UIBC SERPL-MCNC: 257 UG/DL
VIT B12 SERPL-MCNC: 343 PG/ML

## 2025-02-07 ENCOUNTER — APPOINTMENT (OUTPATIENT)
Dept: PLASTIC SURGERY | Facility: CLINIC | Age: 30
End: 2025-02-07
Payer: COMMERCIAL

## 2025-02-07 PROBLEM — T85.44XA CAPSULAR CONTRACTURE OF BREAST IMPLANT, INITIAL ENCOUNTER: Status: ACTIVE | Noted: 2025-02-07

## 2025-02-07 PROCEDURE — 99202 OFFICE O/P NEW SF 15 MIN: CPT | Mod: 93

## 2025-04-07 NOTE — ED ADULT NURSE NOTE - NS TRANSFER PATIENT BELONGINGS
show
Clothing
You can access the FollowMyHealth Patient Portal offered by [facility] by registering at the following website: [email or web address]. By joining [employer]’s FollowMyHealth portal, you will also be able to view your health information using other applications (apps) compatible with our system.

## 2025-07-31 NOTE — ED PROVIDER NOTE - DISCUSSED CLINICAL AND RADIOLOGICAL FINDINGS WITH, MDM
Chief Complaint   Patient presents with    Annual Exam    Shortness of Breath    Chest Pain    Other     LBBB     Vitals:    07/31/25 0931   BP: (!) 150/98   Pulse: 76   SpO2: 98%   Weight: 59 kg (130 lb)   Height: 1.727 m (5' 8\")      BP (!) 150/98   Pulse 76   Ht 1.727 m (5' 8\")   Wt 59 kg (130 lb)   SpO2 98%   BMI 19.77 kg/m²         patient

## (undated) DEVICE — DRSG KERLIX ROLL 4.5"

## (undated) DEVICE — DRSG TUBE SPANDAGE 8 10YD

## (undated) DEVICE — DRAIN RESERVOIR FOR JACKSON PRATT 100CC CARDINAL

## (undated) DEVICE — SUT SILK 2-0 30" PSL

## (undated) DEVICE — DRSG MASTISOL

## (undated) DEVICE — BUR STRYKER EGG 4MM

## (undated) DEVICE — DRAPE MAGNETIC INSTRUMENT MEDIUM

## (undated) DEVICE — NDL 18G BLUNT FILL PINK

## (undated) DEVICE — MARKING PEN W RULER

## (undated) DEVICE — SUT VICRYL 3-0 18" PS-2 UNDYED

## (undated) DEVICE — NDL SPINAL 22G X 3.5" (BLACK)

## (undated) DEVICE — SUCTION YANKAUER BULBOUS TIP NO VENT

## (undated) DEVICE — SYR CONTROL LUER LOK 10CC

## (undated) DEVICE — MIDAS REX LEGEND TAPERED SM BORE 2.3MM X 8CM

## (undated) DEVICE — Device

## (undated) DEVICE — DRSG GAUZE MOISTURIZER 0.5 OZ 4X8

## (undated) DEVICE — SUT MONOCRYL 4-0 18" P-3

## (undated) DEVICE — VAGINAL PACKING 2"

## (undated) DEVICE — SUT ETHILON 5-0 18" PS-2

## (undated) DEVICE — DRAPE TOWEL BLUE 17" X 24"

## (undated) DEVICE — SYR LUER LOK 1CC

## (undated) DEVICE — TAPE SILK 3"

## (undated) DEVICE — MODEL IPS TRANSFORM

## (undated) DEVICE — BLADE SURGICAL #11 CARBON

## (undated) DEVICE — SUCTION YANKAUER VITAL VUE

## (undated) DEVICE — DRSG CURITY GAUZE SPONGE 4 X 4" 12-PLY

## (undated) DEVICE — SUT VICRYL PLUS 3-0 18" PS-2 UNDYED

## (undated) DEVICE — RASP STRYKER LARGE TEAR CROSSCUT 14X7MM DISP

## (undated) DEVICE — SUT CHROMIC 3-0 18"  PS-4C

## (undated) DEVICE — SUT VICRYL 2-0 18" CP-2 UNDYED (POP-OFF)

## (undated) DEVICE — SYR LUER LOK 5CC

## (undated) DEVICE — SUT MONOCRYL 5-0 18" P-3 UNDYED

## (undated) DEVICE — DRSG TELFA 3 X 8

## (undated) DEVICE — DRSG AQUAPLAST BLANK BLUSH 3 X 3"

## (undated) DEVICE — SUT ETHILON 4-0 18" P-3 UNDYED

## (undated) DEVICE — BLADE SURGICAL #15 CARBON

## (undated) DEVICE — AESCULAP SCALPFIX 10 CLIPS

## (undated) DEVICE — BATTERY PACK KLS MARTIN SINGLE USE

## (undated) DEVICE — SUT CHROMIC 3-0 27" PS-2

## (undated) DEVICE — DRILL BIT KLS MARTIN TWIST STOP 1.6X40MM

## (undated) DEVICE — SUT PLAIN GUT 3-0 27" PS-2

## (undated) DEVICE — SAW BLADE STRYKER RECIPROCATING 27MMX0.38MM

## (undated) DEVICE — CATH ANGIOCATH 12G

## (undated) DEVICE — ELCTR COLORADO 3CM

## (undated) DEVICE — NDL HYPO SAFE 25G X 1.5" (ORANGE)

## (undated) DEVICE — COMPRESSION CHIN-NECK SMALL

## (undated) DEVICE — BLADE SURGICAL #10 CARBON

## (undated) DEVICE — SUT CHROMIC 5-0 18" P-3

## (undated) DEVICE — DRSG STERISTRIPS 0.5 X 4"

## (undated) DEVICE — BIPOLAR FORCEP SYMMETRY BAYONET STR 8.25" X 1.5MM (BLUE)

## (undated) DEVICE — BLOCK SILICON

## (undated) DEVICE — SYR LUER LOK 50CC

## (undated) DEVICE — SUT PROLENE 3-0 18" PS-1

## (undated) DEVICE — SUT ETHILON 6-0 18" P-3

## (undated) DEVICE — APPLICATOR Q TIP 6" WOOD STEM

## (undated) DEVICE — PACK UPPER BODY

## (undated) DEVICE — DRSG DERMABOND 0.7ML

## (undated) DEVICE — ELCTR BOVIE TIP BLADE VALLEYLAB 6.5"

## (undated) DEVICE — SUT PDS II PLUS 3-0 18" PS-2 UNDYED

## (undated) DEVICE — SUT PLAIN GUT 4-0 18" PS-2

## (undated) DEVICE — SYR LUER LOK 10CC

## (undated) DEVICE — BLADE MANDIBULAR OSTEOTOMY

## (undated) DEVICE — MIDAS REX MR8 TAPERED SM BORE 2.3MM X 7CM FOOTED

## (undated) DEVICE — LAP PAD 4 X 18"

## (undated) DEVICE — DRILL TWIST J NOTCH 1.5X50MM STRL

## (undated) DEVICE — SUT PLAIN GUT FAST ABSORBING 5-0 PC-1

## (undated) DEVICE — SAW BLADE OSTEOMED VRO 12MM

## (undated) DEVICE — DRAIN BLAKE 10FR ROUND

## (undated) DEVICE — FOLEY TRAY 16FR 5CC LF UMETER CLOSED

## (undated) DEVICE — DRSG XEROFORM 1 X 8"